# Patient Record
Sex: FEMALE | Race: WHITE | NOT HISPANIC OR LATINO | Employment: OTHER | URBAN - METROPOLITAN AREA
[De-identification: names, ages, dates, MRNs, and addresses within clinical notes are randomized per-mention and may not be internally consistent; named-entity substitution may affect disease eponyms.]

---

## 2017-01-28 ENCOUNTER — HOSPITAL ENCOUNTER (EMERGENCY)
Facility: HOSPITAL | Age: 67
Discharge: HOME | End: 2017-01-28
Payer: MEDICARE

## 2017-01-28 VITALS
HEIGHT: 62 IN | BODY MASS INDEX: 23.92 KG/M2 | WEIGHT: 130 LBS | RESPIRATION RATE: 18 BRPM | TEMPERATURE: 98 F | DIASTOLIC BLOOD PRESSURE: 97 MMHG | OXYGEN SATURATION: 95 % | HEART RATE: 94 BPM | SYSTOLIC BLOOD PRESSURE: 169 MMHG

## 2017-01-28 DIAGNOSIS — R06.2 WHEEZING: ICD-10-CM

## 2017-01-28 DIAGNOSIS — J44.1 COPD EXACERBATION: Primary | ICD-10-CM

## 2017-01-28 PROCEDURE — 63600175 PHARM REV CODE 636 W HCPCS: Performed by: PHYSICIAN ASSISTANT

## 2017-01-28 PROCEDURE — 99283 EMERGENCY DEPT VISIT LOW MDM: CPT | Mod: 25

## 2017-01-28 PROCEDURE — 96372 THER/PROPH/DIAG INJ SC/IM: CPT

## 2017-01-28 RX ORDER — DEXAMETHASONE SODIUM PHOSPHATE 4 MG/ML
4 INJECTION, SOLUTION INTRA-ARTICULAR; INTRALESIONAL; INTRAMUSCULAR; INTRAVENOUS; SOFT TISSUE
Status: COMPLETED | OUTPATIENT
Start: 2017-01-28 | End: 2017-01-28

## 2017-01-28 RX ORDER — MOXIFLOXACIN HYDROCHLORIDE 400 MG/1
400 TABLET ORAL DAILY
Qty: 10 TABLET | Refills: 0 | Status: SHIPPED | OUTPATIENT
Start: 2017-01-28 | End: 2017-02-07

## 2017-01-28 RX ORDER — HYDROCODONE POLISTIREX AND CHLORPHENIRAMINE POLISTIREX 10; 8 MG/5ML; MG/5ML
2.5 SUSPENSION, EXTENDED RELEASE ORAL EVERY 12 HOURS PRN
Qty: 35 ML | Refills: 0 | Status: ON HOLD | OUTPATIENT
Start: 2017-01-28 | End: 2017-07-11 | Stop reason: HOSPADM

## 2017-01-28 RX ADMIN — DEXAMETHASONE SODIUM PHOSPHATE 4 MG: 4 INJECTION, SOLUTION INTRAMUSCULAR; INTRAVENOUS at 06:01

## 2017-01-28 NOTE — ED AVS SNAPSHOT
[facility]  [address] [facility] [address]               Kia Smith   2017  5:05 PM   ED    Description:  Female : 1950   Department:  [facility]            Your Care was Coordinated By:     Provider Role From To    ANGEL Kendall Physician Assistant 17 [phone] --      Reason for Visit     Cough           Diagnoses this Visit        Comments    COPD exacerbation    -  Primary     Wheezing           ED Disposition     None           To Do List           Follow-up Information     Follow up with [facility] In 2 days.    Why:  As needed, If symptoms worsen return to ED     Contact information:    [address] [facility] [address]  [phone]         These Medications        Disp Refills Start End    moxifloxacin (AVELOX) 400 mg tablet 10 tablet 0 2017    Take 1 tablet (400 mg total) by mouth once daily. - Oral    hydrocodone-chlorpheniramine (TUSSIONEX) 10-8 mg/5 mL suspension 35 mL 0 2017     Take 2.5 mLs by mouth every 12 (twelve) hours as needed for Cough. - Oral      [facility]     [facility] On Call Nurse Care Line -  Assistance  Registered nurses in the [facility] On Call Center provide clinical advisement, health education, appointment booking, and other advisory services.  Call for this free service at [phone].             Medications           Message regarding Medications     Verify the changes and/or additions to your medication regime listed below are the same as discussed with your clinician today.  If any of these changes or additions are incorrect, please notify your healthcare provider.        START taking these NEW medications        Refills    moxifloxacin (AVELOX) 400 mg tablet 0    Sig: Take 1 tablet (400 mg total) by mouth once daily.    Class: Print    Route: Oral    hydrocodone-chlorpheniramine (TUSSIONEX) 10-8 mg/5 mL suspension 0  "   Sig: Take 2.5 mLs by mouth every 12 (twelve) hours as needed for Cough.    Class: Print    Route: Oral      These medications were administered today        Dose Freq    dexamethasone injection 4 mg 4 mg ED 1 Time    Sig: Inject 1 mL (4 mg total) into the muscle ED 1 Time.    Class: Normal    Route: Intramuscular    Cosign for Ordering: Required by Misael Chandra MD           Verify that the below list of medications is an accurate representation of the medications you are currently taking.  If none reported, the list may be blank. If incorrect, please contact your healthcare provider. Carry this list with you in case of emergency.           Current Medications     hydrocodone-chlorpheniramine (TUSSIONEX) 10-8 mg/5 mL suspension Take 2.5 mLs by mouth every 12 (twelve) hours as needed for Cough.    moxifloxacin (AVELOX) 400 mg tablet Take 1 tablet (400 mg total) by mouth once daily.           Clinical Reference Information           Your Vitals Were     BP Pulse Temp Resp Height Weight    169/97 (BP Location: Right arm, Patient Position: Sitting) 94 98.3 °F (36.8 °C) (Oral) 18 5' 2" (1.575 m) 59 kg (130 lb)    SpO2 BMI             95% 23.78 kg/m2         Allergies as of 1/28/2017        Reactions    Pcn [Penicillins]       Immunizations Administered on Date of Encounter - 1/28/2017     None      ED Micro, Lab, POCT     None      ED Imaging Orders     Start Ordered       Status Ordering Provider    01/28/17 1717 01/28/17 1716  X-Ray Chest PA And Lateral  1 time imaging      Final result         Discharge Instructions         COPD Flare    You have had a flare-up of your COPD.  COPD, or chronic obstructive pulmonary disease, is a common lung disease. It causes your airways to become irritated and narrower. This makes it harder for you to breathe. Emphysema and chronic bronchitis are both types of COPD. This is a chronic condition, which means you always have it. Sometimes it gets worse. When this happens, it is " called a flare-up.  Symptoms of COPD  People with COPD may have symptoms most of the time. In a flare-up, your symptoms get worse. These symptoms may mean you are having a flare-up:  · Shortness of breath, shallow or rapid breathing, or wheezing that gets worse  · Lung infection  · Cough that gets worse  · More mucus, thicker mucus or mucus of a different color  · Tiredness, decreased energy, or trouble doing your usual activities  · Fever  · Chest tightness  · Your symptoms dont get better even when you use your usual medicines, inhalers, and nebulizer  · Trouble talking  · You feel confused  Causes of flare-ups  Unfortunately, a flare-up can happen even though you did everything right, and you followed your doctors instructions. Some causes of flare-ups are:  · Smoking or secondhand smoke  · Colds, the flu, or respiratory infections  · Air pollution  · Sudden change in the weather  · Dust, irritating chemicals, or strong fumes  · Not taking your medicines as prescribed  Home care  Here are some things you can do at home to treat a flare-up:  · Try not to panic. This makes it harder to breathe, and keeps you from doing the right things.  · Dont smoke or be around others who are smoking.  · Try to drink more fluids than usual during a flare-up, unless your doctor has told you not to because of heart and kidney problems. More fluids can help loosen the mucus.  · Use your inhalers and nebulizer, if you have one, as you have been told to.  · If you were given antibiotics, take them until they are used up or your doctor tells you to stop. Its important to finish the antibiotics, even though you feel better. This will make sure the infection has cleared.  · If you were given prednisone or another steroid, finish it even if you feel better.  Preventing a flare-up  Even though flare-ups happen, the best way to treat one is to prevent it before it starts. Here are some pointers:  · Dont smoke or be around others who  are smoking.  · Take your medicines as you have been told.  · Talk with your doctor about getting a flu shot every year. Also find out if you need a pneumonia shot.  · If there is a weather advisory warning to stay indoors, try to stay inside when possible.  · Try to eat healthy and get plenty of sleep.  · Try to avoid things that usually set you off, like dust, chemical fumes, hairsprays, or strong perfumes.  Follow-up care  Follow up with your healthcare provider.  If a culture was done, you will be told if your treatment needs to be changed. You can call as directed for the results.  If X-rays were done, and a radiologist had not seen them while you were there, they will be reviewed. You will be told if there is a change in the reading, especially if it affects your treatment.  Call 911  Call 911 if any of these occur:  · You have trouble breathing  · You feel confused or its difficult to wake you up  · You faint or lose consciousness  · You have a rapid heart rate  · You have new pain in your chest, arm, shoulder, neck or upper back  When to seek medical advice  Call your healthcare provider right away if any of these occur:  · Wheezing or shortness of breath gets worse  · You need to use your inhalers more often than usual without relief  · Fever of 100.4°F (38ºC) or higher, or as directed  · Coughing up lots of dark-colored or bloody sputum (mucus)  · Chest pain with each breath  · You do not start to get better within 24 hours  · Swelling or your ankles gets worse  · Dizziness or weakness     © 1272-4244 [employer]. [address] [place] [address], [place] [address]. All rights reserved. This information is not intended as a substitute for professional medical care. Always follow your healthcare professional's instructions.          [facility] Sign-Up     Activating your [facility] account is as easy as 1-2-3!     1) Visit [email or web address], select Sign Up Now, enter this activation code and your date of  birth, then select Next.  [email or web address]  Expires: 3/14/2017  6:41 PM      2) Create a username and password to use when you visit [facility] in the future and select a security question in case you lose your password and select Next.    3) Enter your e-mail address and click Sign Up!    Additional Information  If you have questions, please e-mail [email or web address] or call [phone] to talk to our [facility] staff. Remember, [facility] is NOT to be used for urgent needs. For medical emergencies, dial 911.         Smoking Cessation     If you would like to quit smoking:   You may be eligible for free services if you are a [place] resident and started smoking cigarettes before September 1, 1988.  Call the Smoking Cessation [employer] (SCT) toll free at (693) 002-0296 or (689) 862-4695.   Call 2-746-QUIT-NOW if you do not meet the above criteria.             [facility] complies with applicable Federal civil rights laws and does not discriminate on the basis of race, color, national origin, age, disability, or sex.        Language Assistance Services     ATTENTION: Language assistance services are available, free of charge. Please call [phone].      ATENCIÓN: Si habla kathyaañol, tiene a feldman disposición servicios gratuitos de asistencia lingüística. Llame al [phone].     [facility] Ý: N?u b?n nói Ti?ng Vi?t, có các d?ch v? h? tr? ngôn ng? mi?n phí dành cho b?n. G?i s? [phone].

## 2017-01-28 NOTE — ED PROVIDER NOTES
SCRIBE #1 NOTE: I, Dottie Serna, am scribing for, and in the presence of, ANGEL Narayanan. I have scribed the entire note.      History      Chief Complaint   Patient presents with    Cough     productive, sore throat, congestion       Review of patient's allergies indicates:   Allergen Reactions    Pcn [penicillins]         HPI   HPI    1/28/2017, 5:06 PM   History obtained from the patient      History of Present Illness: Kia Smith is a 66 y.o. female patient who presents to the Emergency Department for cough with sputum which onset gradually two weeks ago. Symptoms are constant and moderate in severity. Pt notes that she is a smoker. Pt has PMHx of COPD exacerbation. Sxs worsen in the AM. No mitigating or exacerbating factors reported. Associated sxs include wheezing and SOB. Patient denies any fever, n/v/d, CP, chest tightness, HA, dizziness, abdominal pain, dysuria, hematuria, back pain, and all other sxs at this time. No further complaints or concerns at this time.         Arrival mode: Personal vehicle    PCP: No primary care provider on file.       Past Medical History:  History reviewed. No pertinent past medical history.    Past Surgical History:  Unknown    Family History:  History reviewed. No pertinent family history.    Social History:  Social History     Social History Main Topics    Smoking status: Unknown    Smokeless tobacco: Unknown    Alcohol use Unknown    Drug use: Unknown    Sexual activity: Unknown       ROS   Review of Systems   Constitutional: Negative for fever.   HENT: Negative for sore throat.    Respiratory: Positive for cough (with sputum), shortness of breath and wheezing. Negative for chest tightness.    Cardiovascular: Negative for chest pain.   Gastrointestinal: Negative for abdominal pain, constipation, diarrhea, nausea and vomiting.   Genitourinary: Negative for dysuria, flank pain and hematuria.   Musculoskeletal: Negative for back pain.   Skin: Negative  "for rash.   Neurological: Negative for dizziness, weakness, light-headedness and headaches.   Hematological: Does not bruise/bleed easily.       Physical Exam    Initial Vitals   BP Pulse Resp Temp SpO2   01/28/17 1601 01/28/17 1601 01/28/17 1601 01/28/17 1601 01/28/17 1601   169/97 94 18 98.3 °F (36.8 °C) 95 %      Physical Exam  Nursing Notes and Vital Signs Reviewed.  Constitutional: Patient is in no acute distress. Awake and alert. Well-developed and well-nourished.  Head: Atraumatic. Normocephalic.  Eyes: PERRL. EOM intact. Conjunctivae are not pale. No scleral icterus.  ENT: Mucous membranes are moist. Oropharynx is clear and symmetric.    Neck: Supple. Full ROM. No lymphadenopathy.  Cardiovascular: Regular rate. Regular rhythm. No murmurs, rubs, or gallops. Distal pulses are 2+ and symmetric.  Pulmonary/Chest: No respiratory distress. Wheezing bilaterally. No rales or rhonchi.  Musculoskeletal: Moves all extremities. No obvious deformities. No edema. No calf tenderness.  Skin: Warm and dry.  Neurological:  Alert, awake, and appropriate.  Normal speech.  No acute focal neurological deficits are appreciated.  Psychiatric: Normal affect. Good eye contact. Appropriate in content.    ED Course    Procedures  ED Vital Signs:  Vitals:    01/28/17 1601   BP: (!) 169/97   Pulse: 94   Resp: 18   Temp: 98.3 °F (36.8 °C)   TempSrc: Oral   SpO2: 95%   Weight: 59 kg (130 lb)   Height: 5' 2" (1.575 m)       Imaging Results:  Imaging Results         X-Ray Chest PA And Lateral (Final result) Result time:  01/28/17 17:57:15    Final result by Home Baca MD (Timothy) (01/28/17 17:57:15)    Impression:         Normal sized heart. Clear lungs.Few small calcified benign lymph nodes left infrahilar region.      Electronically signed by: HOME BACA MD  Date:     01/28/17  Time:    17:57     Narrative:    CXR, 2 views    Clinical History:    Wheezing                      The Emergency Provider reviewed the vital signs and " test results, which are outlined above.    ED Discussion     6:39 PM: Reassessed pt at this time. Discussed with pt all pertinent ED information and results. Discussed pt dx and plan of tx. Gave pt all f/u and return to the ED instructions. All questions and concerns were addressed at this time. Pt expresses understanding of information and instructions, and is comfortable with plan to discharge. Pt is stable for discharge.      Pre-hypertension/Hypertension: The pt has been informed that they may have pre-hypertension or hypertension based on a blood pressure reading in the ED. I recommend that the pt call the PCP listed on their discharge instructions or a physician of their choice this week to arrange f/u for further evaluation of possible pre-hypertension or hypertension.     ED Medication(s):  Medications   dexamethasone injection 4 mg (4 mg Intramuscular Given 1/28/17 1826)       Discharge Medication List as of 1/28/2017  6:41 PM      START taking these medications    Details   hydrocodone-chlorpheniramine (TUSSIONEX) 10-8 mg/5 mL suspension Take 2.5 mLs by mouth every 12 (twelve) hours as needed for Cough., Starting 1/28/2017, Until Discontinued, Print      moxifloxacin (AVELOX) 400 mg tablet Take 1 tablet (400 mg total) by mouth once daily., Starting 1/28/2017, Until Tue 2/7/17, Print             Follow-up Information     Follow up with [facility] In 2 days.    Why:  As needed, If symptoms worsen return to ED     Contact information:    [address] [facility] [address]  [phone]              Medical Decision Making    Medical Decision Making:   Clinical Tests:   Radiological Study: Ordered and Reviewed           Scribe Attestation:   Scribe #1: I performed the above scribed service and the documentation accurately describes the services I performed. I attest to the accuracy of the note.    Attending:   Physician Attestation Statement for Scribe #1: I, ANGEL Narayanan,  personally performed the services described in this documentation, as scribed by Dottie Serna, in my presence, and it is both accurate and complete.          Clinical Impression       ICD-10-CM ICD-9-CM   1. COPD exacerbation J44.1 491.21   2. Wheezing R06.2 786.07       Disposition:   Disposition: Discharged  Condition: Stable       ANGEL Kendall  01/29/17 4514

## 2017-01-29 NOTE — DISCHARGE INSTRUCTIONS
COPD Flare    You have had a flare-up of your COPD.  COPD, or chronic obstructive pulmonary disease, is a common lung disease. It causes your airways to become irritated and narrower. This makes it harder for you to breathe. Emphysema and chronic bronchitis are both types of COPD. This is a chronic condition, which means you always have it. Sometimes it gets worse. When this happens, it is called a flare-up.  Symptoms of COPD  People with COPD may have symptoms most of the time. In a flare-up, your symptoms get worse. These symptoms may mean you are having a flare-up:  · Shortness of breath, shallow or rapid breathing, or wheezing that gets worse  · Lung infection  · Cough that gets worse  · More mucus, thicker mucus or mucus of a different color  · Tiredness, decreased energy, or trouble doing your usual activities  · Fever  · Chest tightness  · Your symptoms dont get better even when you use your usual medicines, inhalers, and nebulizer  · Trouble talking  · You feel confused  Causes of flare-ups  Unfortunately, a flare-up can happen even though you did everything right, and you followed your doctors instructions. Some causes of flare-ups are:  · Smoking or secondhand smoke  · Colds, the flu, or respiratory infections  · Air pollution  · Sudden change in the weather  · Dust, irritating chemicals, or strong fumes  · Not taking your medicines as prescribed  Home care  Here are some things you can do at home to treat a flare-up:  · Try not to panic. This makes it harder to breathe, and keeps you from doing the right things.  · Dont smoke or be around others who are smoking.  · Try to drink more fluids than usual during a flare-up, unless your doctor has told you not to because of heart and kidney problems. More fluids can help loosen the mucus.  · Use your inhalers and nebulizer, if you have one, as you have been told to.  · If you were given antibiotics, take them until they are used up or your doctor tells you  to stop. Its important to finish the antibiotics, even though you feel better. This will make sure the infection has cleared.  · If you were given prednisone or another steroid, finish it even if you feel better.  Preventing a flare-up  Even though flare-ups happen, the best way to treat one is to prevent it before it starts. Here are some pointers:  · Dont smoke or be around others who are smoking.  · Take your medicines as you have been told.  · Talk with your doctor about getting a flu shot every year. Also find out if you need a pneumonia shot.  · If there is a weather advisory warning to stay indoors, try to stay inside when possible.  · Try to eat healthy and get plenty of sleep.  · Try to avoid things that usually set you off, like dust, chemical fumes, hairsprays, or strong perfumes.  Follow-up care  Follow up with your healthcare provider.  If a culture was done, you will be told if your treatment needs to be changed. You can call as directed for the results.  If X-rays were done, and a radiologist had not seen them while you were there, they will be reviewed. You will be told if there is a change in the reading, especially if it affects your treatment.  Call 911  Call 911 if any of these occur:  · You have trouble breathing  · You feel confused or its difficult to wake you up  · You faint or lose consciousness  · You have a rapid heart rate  · You have new pain in your chest, arm, shoulder, neck or upper back  When to seek medical advice  Call your healthcare provider right away if any of these occur:  · Wheezing or shortness of breath gets worse  · You need to use your inhalers more often than usual without relief  · Fever of 100.4°F (38ºC) or higher, or as directed  · Coughing up lots of dark-colored or bloody sputum (mucus)  · Chest pain with each breath  · You do not start to get better within 24 hours  · Swelling or your ankles gets worse  · Dizziness or weakness     © 1997-3498 [employer] [address] [place] [address], [place] [address]. All rights reserved. This information is not intended as a substitute for professional medical care. Always follow your healthcare professional's instructions.

## 2017-07-10 ENCOUNTER — HOSPITAL ENCOUNTER (OUTPATIENT)
Facility: HOSPITAL | Age: 67
Discharge: HOME | End: 2017-07-11
Attending: EMERGENCY MEDICINE | Admitting: INTERNAL MEDICINE
Payer: MEDICARE

## 2017-07-10 DIAGNOSIS — Z72.0 NICOTINE ABUSE: ICD-10-CM

## 2017-07-10 DIAGNOSIS — R07.9 CHEST PAIN: ICD-10-CM

## 2017-07-10 DIAGNOSIS — R07.89 ATYPICAL CHEST PAIN: ICD-10-CM

## 2017-07-10 DIAGNOSIS — R03.0 ELEVATED BLOOD PRESSURE READING: Primary | ICD-10-CM

## 2017-07-10 DIAGNOSIS — I25.9 CHRONIC ISCHEMIC HEART DISEASE: ICD-10-CM

## 2017-07-10 DIAGNOSIS — F17.200 SMOKING: ICD-10-CM

## 2017-07-10 LAB
ALBUMIN SERPL BCP-MCNC: 3.5 G/DL
ALP SERPL-CCNC: 90 U/L
ALT SERPL W/O P-5'-P-CCNC: 16 U/L
ANION GAP SERPL CALC-SCNC: 9 MMOL/L
APTT BLDCRRT: 24.8 SEC
AST SERPL-CCNC: 24 U/L
BASOPHILS # BLD AUTO: 0.09 K/UL
BASOPHILS NFR BLD: 1.1 %
BILIRUB SERPL-MCNC: 0.3 MG/DL
BUN SERPL-MCNC: 13 MG/DL
CALCIUM SERPL-MCNC: 9 MG/DL
CHLORIDE SERPL-SCNC: 108 MMOL/L
CK SERPL-CCNC: 150 U/L
CO2 SERPL-SCNC: 25 MMOL/L
CREAT SERPL-MCNC: 0.8 MG/DL
D DIMER PPP IA.FEU-MCNC: 0.47 MG/L FEU
DIFFERENTIAL METHOD: NORMAL
EOSINOPHIL # BLD AUTO: 0.2 K/UL
EOSINOPHIL NFR BLD: 2.7 %
ERYTHROCYTE [DISTWIDTH] IN BLOOD BY AUTOMATED COUNT: 13.3 %
EST. GFR  (AFRICAN AMERICAN): >60 ML/MIN/1.73 M^2
EST. GFR  (NON AFRICAN AMERICAN): >60 ML/MIN/1.73 M^2
GLUCOSE SERPL-MCNC: 97 MG/DL
HCT VFR BLD AUTO: 37.7 %
HGB BLD-MCNC: 12.9 G/DL
INR PPP: 1
LIPASE SERPL-CCNC: 41 U/L
LYMPHOCYTES # BLD AUTO: 3 K/UL
LYMPHOCYTES NFR BLD: 35.1 %
MCH RBC QN AUTO: 30.4 PG
MCHC RBC AUTO-ENTMCNC: 34.2 %
MCV RBC AUTO: 89 FL
MONOCYTES # BLD AUTO: 0.5 K/UL
MONOCYTES NFR BLD: 5.9 %
NEUTROPHILS # BLD AUTO: 4.7 K/UL
NEUTROPHILS NFR BLD: 55.2 %
PLATELET # BLD AUTO: 188 K/UL
PMV BLD AUTO: 9.9 FL
POTASSIUM SERPL-SCNC: 4.2 MMOL/L
PROT SERPL-MCNC: 6.9 G/DL
PROTHROMBIN TIME: 9.9 SEC
RBC # BLD AUTO: 4.25 M/UL
SODIUM SERPL-SCNC: 142 MMOL/L
TROPONIN I SERPL DL<=0.01 NG/ML-MCNC: 0.01 NG/ML
WBC # BLD AUTO: 8.5 K/UL

## 2017-07-10 PROCEDURE — 99285 EMERGENCY DEPT VISIT HI MDM: CPT | Mod: 25

## 2017-07-10 PROCEDURE — 83690 ASSAY OF LIPASE: CPT

## 2017-07-10 PROCEDURE — 85025 COMPLETE CBC W/AUTO DIFF WBC: CPT

## 2017-07-10 PROCEDURE — 82550 ASSAY OF CK (CPK): CPT | Mod: 91

## 2017-07-10 PROCEDURE — 85730 THROMBOPLASTIN TIME PARTIAL: CPT

## 2017-07-10 PROCEDURE — 93010 ELECTROCARDIOGRAM REPORT: CPT | Mod: ,,, | Performed by: INTERNAL MEDICINE

## 2017-07-10 PROCEDURE — 63600175 PHARM REV CODE 636 W HCPCS: Performed by: EMERGENCY MEDICINE

## 2017-07-10 PROCEDURE — 80053 COMPREHEN METABOLIC PANEL: CPT

## 2017-07-10 PROCEDURE — 93005 ELECTROCARDIOGRAM TRACING: CPT

## 2017-07-10 PROCEDURE — C9113 INJ PANTOPRAZOLE SODIUM, VIA: HCPCS | Performed by: EMERGENCY MEDICINE

## 2017-07-10 PROCEDURE — 85379 FIBRIN DEGRADATION QUANT: CPT

## 2017-07-10 PROCEDURE — 25000003 PHARM REV CODE 250: Performed by: EMERGENCY MEDICINE

## 2017-07-10 PROCEDURE — 84484 ASSAY OF TROPONIN QUANT: CPT

## 2017-07-10 PROCEDURE — 85610 PROTHROMBIN TIME: CPT

## 2017-07-10 PROCEDURE — 96374 THER/PROPH/DIAG INJ IV PUSH: CPT

## 2017-07-10 RX ORDER — PANTOPRAZOLE SODIUM 40 MG/10ML
40 INJECTION, POWDER, LYOPHILIZED, FOR SOLUTION INTRAVENOUS
Status: COMPLETED | OUTPATIENT
Start: 2017-07-10 | End: 2017-07-10

## 2017-07-10 RX ORDER — PANTOPRAZOLE SODIUM 40 MG/1
40 TABLET, DELAYED RELEASE ORAL
Status: DISCONTINUED | OUTPATIENT
Start: 2017-07-10 | End: 2017-07-10

## 2017-07-10 RX ADMIN — PANTOPRAZOLE SODIUM 40 MG: 40 INJECTION, POWDER, FOR SOLUTION INTRAVENOUS at 08:07

## 2017-07-10 RX ADMIN — LIDOCAINE HYDROCHLORIDE 50 ML: 20 SOLUTION ORAL; TOPICAL at 08:07

## 2017-07-11 ENCOUNTER — HOSPITAL ENCOUNTER (OUTPATIENT)
Dept: PULMONOLOGY | Facility: HOSPITAL | Age: 67
Discharge: HOME | End: 2017-07-11
Attending: INTERNAL MEDICINE
Payer: MEDICARE

## 2017-07-11 VITALS
TEMPERATURE: 99 F | DIASTOLIC BLOOD PRESSURE: 68 MMHG | RESPIRATION RATE: 20 BRPM | WEIGHT: 151 LBS | OXYGEN SATURATION: 97 % | BODY MASS INDEX: 27.79 KG/M2 | HEIGHT: 62 IN | SYSTOLIC BLOOD PRESSURE: 119 MMHG | HEART RATE: 71 BPM

## 2017-07-11 DIAGNOSIS — R07.9 CHEST PAIN: ICD-10-CM

## 2017-07-11 DIAGNOSIS — R07.9 CHEST PAIN: Primary | ICD-10-CM

## 2017-07-11 PROBLEM — Z72.0 NICOTINE ABUSE: Status: ACTIVE | Noted: 2017-07-11

## 2017-07-11 LAB
CHOLEST/HDLC SERPL: 6.9 {RATIO}
CK SERPL-CCNC: 108 U/L
CK SERPL-CCNC: 108 U/L
CK SERPL-CCNC: 128 U/L
DIASTOLIC DYSFUNCTION: NO
DIASTOLIC DYSFUNCTION: NO
ESTIMATED PA SYSTOLIC PRESSURE: 22.28
HDL/CHOLESTEROL RATIO: 14.5 %
HDLC SERPL-MCNC: 221 MG/DL
HDLC SERPL-MCNC: 32 MG/DL
LDLC SERPL CALC-MCNC: 118.2 MG/DL
NONHDLC SERPL-MCNC: 189 MG/DL
RETIRED EF AND QEF - SEE NOTES: 60 (ref 55–65)
TRIGL SERPL-MCNC: 354 MG/DL
TROPONIN I SERPL DL<=0.01 NG/ML-MCNC: 0.01 NG/ML
TROPONIN I SERPL DL<=0.01 NG/ML-MCNC: 0.02 NG/ML
TROPONIN I SERPL DL<=0.01 NG/ML-MCNC: 0.02 NG/ML
TSH SERPL DL<=0.005 MIU/L-ACNC: 3.51 UIU/ML

## 2017-07-11 PROCEDURE — G0378 HOSPITAL OBSERVATION PER HR: HCPCS

## 2017-07-11 PROCEDURE — 25000003 PHARM REV CODE 250: Performed by: INTERNAL MEDICINE

## 2017-07-11 PROCEDURE — 25000003 PHARM REV CODE 250: Performed by: EMERGENCY MEDICINE

## 2017-07-11 PROCEDURE — 82550 ASSAY OF CK (CPK): CPT

## 2017-07-11 PROCEDURE — 84484 ASSAY OF TROPONIN QUANT: CPT

## 2017-07-11 PROCEDURE — 82550 ASSAY OF CK (CPK): CPT | Mod: 91

## 2017-07-11 PROCEDURE — 93010 ELECTROCARDIOGRAM REPORT: CPT | Mod: 59,ICN,, | Performed by: INTERNAL MEDICINE

## 2017-07-11 PROCEDURE — 84484 ASSAY OF TROPONIN QUANT: CPT | Mod: 91

## 2017-07-11 PROCEDURE — 80061 LIPID PANEL: CPT

## 2017-07-11 PROCEDURE — 36415 COLL VENOUS BLD VENIPUNCTURE: CPT

## 2017-07-11 PROCEDURE — 93306 TTE W/DOPPLER COMPLETE: CPT

## 2017-07-11 PROCEDURE — 93018 CV STRESS TEST I&R ONLY: CPT | Mod: ,,, | Performed by: INTERNAL MEDICINE

## 2017-07-11 PROCEDURE — 93306 TTE W/DOPPLER COMPLETE: CPT | Mod: 26,,, | Performed by: INTERNAL MEDICINE

## 2017-07-11 PROCEDURE — 93016 CV STRESS TEST SUPVJ ONLY: CPT | Mod: ,,, | Performed by: INTERNAL MEDICINE

## 2017-07-11 PROCEDURE — 99204 OFFICE O/P NEW MOD 45 MIN: CPT | Mod: 25,,, | Performed by: INTERNAL MEDICINE

## 2017-07-11 PROCEDURE — 78452 HT MUSCLE IMAGE SPECT MULT: CPT | Mod: 26,,, | Performed by: INTERNAL MEDICINE

## 2017-07-11 PROCEDURE — 63600175 PHARM REV CODE 636 W HCPCS: Performed by: NURSE PRACTITIONER

## 2017-07-11 PROCEDURE — 84443 ASSAY THYROID STIM HORMONE: CPT

## 2017-07-11 RX ORDER — RAMELTEON 8 MG/1
8 TABLET ORAL NIGHTLY PRN
Status: DISCONTINUED | OUTPATIENT
Start: 2017-07-11 | End: 2017-07-11 | Stop reason: HOSPADM

## 2017-07-11 RX ORDER — ACETAMINOPHEN 500 MG
500 TABLET ORAL EVERY 4 HOURS PRN
Status: DISCONTINUED | OUTPATIENT
Start: 2017-07-11 | End: 2017-07-11 | Stop reason: HOSPADM

## 2017-07-11 RX ORDER — FAMOTIDINE 20 MG/1
20 TABLET, FILM COATED ORAL 2 TIMES DAILY
Qty: 60 TABLET | Refills: 0 | Status: SHIPPED | OUTPATIENT
Start: 2017-07-11 | End: 2017-07-18

## 2017-07-11 RX ORDER — NITROGLYCERIN 0.4 MG/1
0.4 TABLET SUBLINGUAL EVERY 5 MIN PRN
Status: DISCONTINUED | OUTPATIENT
Start: 2017-07-11 | End: 2017-07-11 | Stop reason: HOSPADM

## 2017-07-11 RX ORDER — ASPIRIN 325 MG
325 TABLET ORAL DAILY
Status: DISCONTINUED | OUTPATIENT
Start: 2017-07-11 | End: 2017-07-11 | Stop reason: HOSPADM

## 2017-07-11 RX ORDER — NAPROXEN SODIUM 220 MG/1
81 TABLET, FILM COATED ORAL
Status: COMPLETED | OUTPATIENT
Start: 2017-07-11 | End: 2017-07-11

## 2017-07-11 RX ORDER — PANTOPRAZOLE SODIUM 40 MG/1
40 TABLET, DELAYED RELEASE ORAL DAILY
Status: DISCONTINUED | OUTPATIENT
Start: 2017-07-11 | End: 2017-07-11 | Stop reason: HOSPADM

## 2017-07-11 RX ORDER — HYDROCODONE BITARTRATE AND ACETAMINOPHEN 5; 325 MG/1; MG/1
1 TABLET ORAL EVERY 4 HOURS PRN
Status: DISCONTINUED | OUTPATIENT
Start: 2017-07-11 | End: 2017-07-11 | Stop reason: HOSPADM

## 2017-07-11 RX ORDER — REGADENOSON 0.08 MG/ML
0.4 INJECTION, SOLUTION INTRAVENOUS ONCE
Status: COMPLETED | OUTPATIENT
Start: 2017-07-11 | End: 2017-07-11

## 2017-07-11 RX ORDER — FAMOTIDINE 20 MG/1
20 TABLET, FILM COATED ORAL 2 TIMES DAILY
Qty: 60 TABLET | Refills: 0 | Status: SHIPPED | OUTPATIENT
Start: 2017-07-11 | End: 2017-07-11

## 2017-07-11 RX ORDER — AMOXICILLIN 250 MG
1 CAPSULE ORAL 2 TIMES DAILY
Status: DISCONTINUED | OUTPATIENT
Start: 2017-07-11 | End: 2017-07-11 | Stop reason: HOSPADM

## 2017-07-11 RX ORDER — ENOXAPARIN SODIUM 100 MG/ML
40 INJECTION SUBCUTANEOUS EVERY 24 HOURS
Status: DISCONTINUED | OUTPATIENT
Start: 2017-07-11 | End: 2017-07-11 | Stop reason: HOSPADM

## 2017-07-11 RX ORDER — HYDROCODONE BITARTRATE AND ACETAMINOPHEN 10; 325 MG/1; MG/1
1 TABLET ORAL EVERY 4 HOURS PRN
Status: DISCONTINUED | OUTPATIENT
Start: 2017-07-11 | End: 2017-07-11 | Stop reason: HOSPADM

## 2017-07-11 RX ORDER — SODIUM CHLORIDE 9 MG/ML
INJECTION, SOLUTION INTRAVENOUS CONTINUOUS
Status: DISCONTINUED | OUTPATIENT
Start: 2017-07-11 | End: 2017-07-11 | Stop reason: HOSPADM

## 2017-07-11 RX ADMIN — PANTOPRAZOLE SODIUM 40 MG: 40 TABLET, DELAYED RELEASE ORAL at 09:07

## 2017-07-11 RX ADMIN — ASPIRIN 325 MG ORAL TABLET 325 MG: 325 PILL ORAL at 09:07

## 2017-07-11 RX ADMIN — ASPIRIN 81 MG 81 MG: 81 TABLET ORAL at 01:07

## 2017-07-11 RX ADMIN — REGADENOSON 0.4 MG: 0.08 INJECTION, SOLUTION INTRAVENOUS at 03:07

## 2017-07-11 RX ADMIN — STANDARDIZED SENNA CONCENTRATE AND DOCUSATE SODIUM 1 TABLET: 8.6; 5 TABLET, FILM COATED ORAL at 09:07

## 2017-07-11 RX ADMIN — SODIUM CHLORIDE: 0.9 INJECTION, SOLUTION INTRAVENOUS at 03:07

## 2017-07-11 NOTE — H&P
[facility] Hospital Medicine  History & Physical    Patient Name: Kia Smith  MRN: [id number]  Admission Date: 7/10/2017  Attending Physician: Thee Bonilla MD  Primary Care Provider: No primary care provider on file.         Patient information was obtained from patient and ER records.     Subjective:     Principal Problem:<principal problem not specified>    Chief Complaint:   Chief Complaint   Patient presents with    Chest Pain     Pt reports substernal constant CP, radiating into back, x3hrs PTA. Also reports SOB, +pain with deep inspiration. +nausea. +lightheadedness/dizziness, skin pale.        HPI: The patient is a 67-year old female presenting with sudden onset of severe retrosternal chest pressure that started about 6pm while watching television. She states she is under a lot of stress and lives with her daughter and three grandchildren. She states that over more than a week she had been having intermittent chest pressure that was however mild compared with this one. She is presently pain free. She denes any cough, hemoptysis, chills, fever or wheezing.    History reviewed. No pertinent past medical history.    Past Surgical History:   Procedure Laterality Date    APPENDECTOMY      CHOLECYSTECTOMY      HYSTERECTOMY         Review of patient's allergies indicates:   Allergen Reactions    Demerol [meperidine]     Pcn [penicillins]        No current facility-administered medications on file prior to encounter.      Current Outpatient Prescriptions on File Prior to Encounter   Medication Sig    hydrocodone-chlorpheniramine (TUSSIONEX) 10-8 mg/5 mL suspension Take 2.5 mLs by mouth every 12 (twelve) hours as needed for Cough.     Family History     None        Social History Main Topics    Smoking status: Current Every Day Smoker     Packs/day: 0.50     Types: Cigarettes    Smokeless tobacco: Never Used    Alcohol use No    Drug use: No    Sexual activity: Not on file      Review of Systems   Constitutional: Negative.    HENT: Negative.  Negative for facial swelling, nosebleeds, sinus pressure and sore throat.    Eyes: Negative.  Negative for photophobia, pain, discharge, redness, itching and visual disturbance.   Respiratory: Negative.  Negative for apnea, cough, choking, shortness of breath, wheezing and stridor.    Cardiovascular: Positive for chest pain. Negative for palpitations and leg swelling.   Gastrointestinal: Negative.  Negative for abdominal distention, abdominal pain, blood in stool, constipation, diarrhea and nausea.   Endocrine: Negative.  Negative for cold intolerance, heat intolerance, polydipsia, polyphagia and polyuria.   Genitourinary: Negative.  Negative for difficulty urinating, dysuria, enuresis, flank pain, hematuria and urgency.   Musculoskeletal: Negative.  Negative for arthralgias, back pain, gait problem, joint swelling, myalgias, neck pain and neck stiffness.   Skin: Negative.  Negative for color change, pallor, rash and wound.   Allergic/Immunologic: Negative.  Negative for environmental allergies, food allergies and immunocompromised state.   Neurological: Negative.  Negative for dizziness, facial asymmetry, light-headedness, numbness and headaches.   Hematological: Negative for adenopathy. Does not bruise/bleed easily.   Psychiatric/Behavioral: Negative.  Negative for agitation, behavioral problems, confusion, decreased concentration, dysphoric mood and hallucinations. The patient is not nervous/anxious and is not hyperactive.    All other systems reviewed and are negative.    Objective:     Vital Signs (Most Recent):  Temp: 98 °F (36.7 °C) (07/11/17 0320)  Pulse: 70 (07/11/17 0320)  Resp: 18 (07/11/17 0320)  BP: 136/62 (07/11/17 0320)  SpO2: 95 % (07/11/17 0320) Vital Signs (24h Range):  Temp:  [98 °F (36.7 °C)-98.1 °F (36.7 °C)] 98 °F (36.7 °C)  Pulse:  [70-85] 70  Resp:  [15-20] 18  SpO2:  [95 %-100 %] 95 %  BP: (114-184)/(52-75) 136/62      Weight: 63.5 kg (140 lb)  Body mass index is 25.61 kg/m².    Physical Exam   Constitutional: She is oriented to person, place, and time. She appears well-developed and well-nourished. No distress.   HENT:   Head: Normocephalic and atraumatic.   Right Ear: External ear normal.   Left Ear: External ear normal.   Nose: Nose normal.   Mouth/Throat: Oropharynx is clear and moist. No oropharyngeal exudate.   Eyes: Conjunctivae and EOM are normal. Pupils are equal, round, and reactive to light. Right eye exhibits no discharge. Left eye exhibits no discharge. No scleral icterus.   Xanthelasmata over left malar area   Neck: Normal range of motion. Neck supple. No JVD present. No tracheal deviation present. No thyromegaly present.   Cardiovascular: Normal rate, regular rhythm, normal heart sounds and intact distal pulses.  Exam reveals no gallop and no friction rub.    No murmur heard.  Pulmonary/Chest: No respiratory distress. She has no wheezes. She has no rales. She exhibits no tenderness.   Abdominal: Soft. Bowel sounds are normal. She exhibits no distension and no mass. There is no tenderness. There is no rebound and no guarding. No hernia.   Musculoskeletal: Normal range of motion. She exhibits no edema, tenderness or deformity.   Lymphadenopathy:     She has no cervical adenopathy.   Neurological: She is alert and oriented to person, place, and time. She has normal reflexes. She displays normal reflexes. No cranial nerve deficit. Coordination normal.   Skin: Skin is warm and dry. Capillary refill takes less than 2 seconds. No rash noted. She is not diaphoretic. No erythema. No pallor.   Psychiatric: She has a normal mood and affect. Her behavior is normal. Judgment and thought content normal.   Nursing note and vitals reviewed.       Significant Labs:   Recent Lab Results       07/10/17  2321 07/10/17  2037      Albumin  3.5     Alkaline Phosphatase  90     ALT  16     Anion Gap  9     aPTT  24.8  Comment:  aPTT  therapeutic range = 39-69 seconds     AST  24     Baso #  0.09     Basophil%  1.1     Total Bilirubin  0.3  Comment:  For infants and newborns, interpretation of results should be based  on gestational age, weight and in agreement with clinical  observations.  Premature Infant recommended reference ranges:  Up to 24 hours.............<8.0 mg/dL  Up to 48 hours............<12.0 mg/dL  3-5 days..................<15.0 mg/dL  6-29 days.................<15.0 mg/dL       BUN, Bld  13     Calcium  9.0     Chloride  108     CO2  25      150     Creatinine  0.8     D-Dimer  0.47  Comment:  The quantitative D-dimer assay should be used as an aid in   the diagnosis of deep vein thrombosis and pulmonary embolism  in patients with the appropriate presentation and clinical  history. Causes of a positive (>0.50 mg/L FEU) D-Dimer test  include, but are not limited to: DVT, PE, DIC, thrombolytic   therapy, anticoagulant therapy, recent surgery, trauma, or   pregnancy, disseminated malignancy, aortic aneurysm, cirrhosis,  and severe infection. False negative results may occur in   patients with distal DVT.       Differential Method  Automated     eGFR if   >60     eGFR if non   >60  Comment:  Calculation used to obtain the estimated glomerular filtration  rate (eGFR) is the CKD-EPI equation. Since race is unknown   in our information system, the eGFR values for   -American and Non--American patients are given   for each creatinine result.       Eos #  0.2     Eosinophil%  2.7     Glucose  97     Gran #  4.7     Gran%  55.2     Hematocrit  37.7     Hemoglobin  12.9     Coumadin Monitoring INR  1.0  Comment:  Coumadin Therapy:  2.0 - 3.0 for INR for all indicators except mechanical heart valves  and antiphospholipid syndromes which should use 2.5 - 3.5.       Lipase  41     Lymph #  3.0     Lymph%  35.1     MCH  30.4     MCHC  34.2     MCV  89     Mono #  0.5     Mono%  5.9     MPV   9.9     Platelets  188     Potassium  4.2     Total Protein  6.9     Protime  9.9     RBC  4.25     RDW  13.3     Sodium  142     Troponin I 0.023  Comment:  The reference interval for Troponin I represents the 99th percentile   cutoff   for our facility and is consistent with 3rd generation assay   performance.   0.010  Comment:  The reference interval for Troponin I represents the 99th percentile   cutoff   for our facility and is consistent with 3rd generation assay   performance.       WBC  8.50         All pertinent labs within the past 24 hours have been reviewed.    Significant Imaging:   Imaging Results          X-Ray Chest AP Portable (Final result)  Result time 07/10/17 21:06:35    Final result by Maico Rebolledo Jr., MD (07/10/17 21:06:35)                 Impression:          No acute findings       Electronically signed by: MAICO REBOLLEDO MD  Date:     07/10/17  Time:    21:06              Narrative:    EXAM:   XR CHEST AP PORTABLE    CLINICAL HISTORY:   Other chest pain    COMPARISON:  01/28/2017    FINDINGS:   Heart size and pulmonary vascularity appear normal. Lungs are well aerated and appear clear. No suspicious mass, infiltrate or effusion.  Benign left hilar calcification consistent with granulomatous disease, similar to before                            Assessment/Plan:     Chest pain    She has multiple risk factors: age, positive family history of cardiac deaths in her male family members, smoking, xanthelasma. Will admit to rule out MI            VTE Risk Mitigation         Ordered     enoxaparin injection 40 mg  Daily     Route:  Subcutaneous        07/11/17 0309     Medium Risk of VTE  Once      07/11/17 0309        Thee Bonilla MD  Department of Hospital Medicine   [facility]

## 2017-07-11 NOTE — HPI
The patient is a 67-year old female presenting with sudden onset of severe retrosternal chest pressure that started about 6pm while watching television. She states she is under a lot of stress and lives with her daughter and three grandchildren. She states that over more than a week she had been having intermittent chest pressure that was however mild compared with this one. She is presently pain free. She denes any cough, hemoptysis, chills, fever or wheezing.  Hospital Medicine consulted for admission.  Positive family h/o CAD.

## 2017-07-11 NOTE — PLAN OF CARE
Problem: Patient Care Overview  Goal: Plan of Care Review  Outcome: Ongoing (interventions implemented as appropriate)  Patient awake, alert, oriented. Respirations even and unlabored. Patient on room air.  Patient remained free of falls on this shift. No skin breakdown noted.  PIV clean, dry and intact. Patient denies pain at this time.  Room free of clutter. Bed locked and in lowest position. Call light within reach.  Safety precautions in place. Side rails up x2. Patient on telemetry monitor. Sinus rhythm on the monitor. POC reviewed. No concerns voiced at this time. Will continue to monitor patient.

## 2017-07-11 NOTE — ASSESSMENT & PLAN NOTE
She has multiple risk factors: age, positive family history of cardiac deaths in her male family members, smoking, xanthelasma. Will admit to rule out MI

## 2017-07-11 NOTE — SUBJECTIVE & OBJECTIVE
History reviewed. No pertinent past medical history.    Past Surgical History:   Procedure Laterality Date    APPENDECTOMY      CHOLECYSTECTOMY      HYSTERECTOMY         Review of patient's allergies indicates:   Allergen Reactions    Demerol [meperidine]     Pcn [penicillins]        No current facility-administered medications on file prior to encounter.      Current Outpatient Prescriptions on File Prior to Encounter   Medication Sig    hydrocodone-chlorpheniramine (TUSSIONEX) 10-8 mg/5 mL suspension Take 2.5 mLs by mouth every 12 (twelve) hours as needed for Cough.     Family History     None        Social History Main Topics    Smoking status: Current Every Day Smoker     Packs/day: 0.50     Types: Cigarettes    Smokeless tobacco: Never Used    Alcohol use No    Drug use: No    Sexual activity: Not on file     Review of Systems   Constitutional: Negative.    HENT: Negative.  Negative for facial swelling, nosebleeds, sinus pressure and sore throat.    Eyes: Negative.  Negative for photophobia, pain, discharge, redness, itching and visual disturbance.   Respiratory: Negative.  Negative for apnea, cough, choking, shortness of breath, wheezing and stridor.    Cardiovascular: Positive for chest pain. Negative for palpitations and leg swelling.   Gastrointestinal: Negative.  Negative for abdominal distention, abdominal pain, blood in stool, constipation, diarrhea and nausea.   Endocrine: Negative.  Negative for cold intolerance, heat intolerance, polydipsia, polyphagia and polyuria.   Genitourinary: Negative.  Negative for difficulty urinating, dysuria, enuresis, flank pain, hematuria and urgency.   Musculoskeletal: Negative.  Negative for arthralgias, back pain, gait problem, joint swelling, myalgias, neck pain and neck stiffness.   Skin: Negative.  Negative for color change, pallor, rash and wound.   Allergic/Immunologic: Negative.  Negative for environmental allergies, food allergies and  immunocompromised state.   Neurological: Negative.  Negative for dizziness, facial asymmetry, light-headedness, numbness and headaches.   Hematological: Negative for adenopathy. Does not bruise/bleed easily.   Psychiatric/Behavioral: Negative.  Negative for agitation, behavioral problems, confusion, decreased concentration, dysphoric mood and hallucinations. The patient is not nervous/anxious and is not hyperactive.    All other systems reviewed and are negative.    Objective:     Vital Signs (Most Recent):  Temp: 98 °F (36.7 °C) (07/11/17 0320)  Pulse: 70 (07/11/17 0320)  Resp: 18 (07/11/17 0320)  BP: 136/62 (07/11/17 0320)  SpO2: 95 % (07/11/17 0320) Vital Signs (24h Range):  Temp:  [98 °F (36.7 °C)-98.1 °F (36.7 °C)] 98 °F (36.7 °C)  Pulse:  [70-85] 70  Resp:  [15-20] 18  SpO2:  [95 %-100 %] 95 %  BP: (114-184)/(52-75) 136/62     Weight: 63.5 kg (140 lb)  Body mass index is 25.61 kg/m².    Physical Exam   Constitutional: She is oriented to person, place, and time. She appears well-developed and well-nourished. No distress.   HENT:   Head: Normocephalic and atraumatic.   Right Ear: External ear normal.   Left Ear: External ear normal.   Nose: Nose normal.   Mouth/Throat: Oropharynx is clear and moist. No oropharyngeal exudate.   Eyes: Conjunctivae and EOM are normal. Pupils are equal, round, and reactive to light. Right eye exhibits no discharge. Left eye exhibits no discharge. No scleral icterus.   Xanthelasmata over left malar area   Neck: Normal range of motion. Neck supple. No JVD present. No tracheal deviation present. No thyromegaly present.   Cardiovascular: Normal rate, regular rhythm, normal heart sounds and intact distal pulses.  Exam reveals no gallop and no friction rub.    No murmur heard.  Pulmonary/Chest: No respiratory distress. She has no wheezes. She has no rales. She exhibits no tenderness.   Abdominal: Soft. Bowel sounds are normal. She exhibits no distension and no mass. There is no  tenderness. There is no rebound and no guarding. No hernia.   Musculoskeletal: Normal range of motion. She exhibits no edema, tenderness or deformity.   Lymphadenopathy:     She has no cervical adenopathy.   Neurological: She is alert and oriented to person, place, and time. She has normal reflexes. She displays normal reflexes. No cranial nerve deficit. Coordination normal.   Skin: Skin is warm and dry. Capillary refill takes less than 2 seconds. No rash noted. She is not diaphoretic. No erythema. No pallor.   Psychiatric: She has a normal mood and affect. Her behavior is normal. Judgment and thought content normal.   Nursing note and vitals reviewed.       Significant Labs:   Recent Lab Results       07/10/17  2321 07/10/17  2037      Albumin  3.5     Alkaline Phosphatase  90     ALT  16     Anion Gap  9     aPTT  24.8  Comment:  aPTT therapeutic range = 39-69 seconds     AST  24     Baso #  0.09     Basophil%  1.1     Total Bilirubin  0.3  Comment:  For infants and newborns, interpretation of results should be based  on gestational age, weight and in agreement with clinical  observations.  Premature Infant recommended reference ranges:  Up to 24 hours.............<8.0 mg/dL  Up to 48 hours............<12.0 mg/dL  3-5 days..................<15.0 mg/dL  6-29 days.................<15.0 mg/dL       BUN, Bld  13     Calcium  9.0     Chloride  108     CO2  25      150     Creatinine  0.8     D-Dimer  0.47  Comment:  The quantitative D-dimer assay should be used as an aid in   the diagnosis of deep vein thrombosis and pulmonary embolism  in patients with the appropriate presentation and clinical  history. Causes of a positive (>0.50 mg/L FEU) D-Dimer test  include, but are not limited to: DVT, PE, DIC, thrombolytic   therapy, anticoagulant therapy, recent surgery, trauma, or   pregnancy, disseminated malignancy, aortic aneurysm, cirrhosis,  and severe infection. False negative results may occur in   patients with  distal DVT.       Differential Method  Automated     eGFR if   >60     eGFR if non   >60  Comment:  Calculation used to obtain the estimated glomerular filtration  rate (eGFR) is the CKD-EPI equation. Since race is unknown   in our information system, the eGFR values for   -American and Non--American patients are given   for each creatinine result.       Eos #  0.2     Eosinophil%  2.7     Glucose  97     Gran #  4.7     Gran%  55.2     Hematocrit  37.7     Hemoglobin  12.9     Coumadin Monitoring INR  1.0  Comment:  Coumadin Therapy:  2.0 - 3.0 for INR for all indicators except mechanical heart valves  and antiphospholipid syndromes which should use 2.5 - 3.5.       Lipase  41     Lymph #  3.0     Lymph%  35.1     MCH  30.4     MCHC  34.2     MCV  89     Mono #  0.5     Mono%  5.9     MPV  9.9     Platelets  188     Potassium  4.2     Total Protein  6.9     Protime  9.9     RBC  4.25     RDW  13.3     Sodium  142     Troponin I 0.023  Comment:  The reference interval for Troponin I represents the 99th percentile   cutoff   for our facility and is consistent with 3rd generation assay   performance.   0.010  Comment:  The reference interval for Troponin I represents the 99th percentile   cutoff   for our facility and is consistent with 3rd generation assay   performance.       WBC  8.50         All pertinent labs within the past 24 hours have been reviewed.    Significant Imaging:   Imaging Results          X-Ray Chest AP Portable (Final result)  Result time 07/10/17 21:06:35    Final result by Maico Rebolledo Jr., MD (07/10/17 21:06:35)                 Impression:          No acute findings       Electronically signed by: MAICO REBOLLEDO MD  Date:     07/10/17  Time:    21:06              Narrative:    EXAM:   XR CHEST AP PORTABLE    CLINICAL HISTORY:   Other chest pain    COMPARISON:  01/28/2017    FINDINGS:   Heart size and pulmonary vascularity appear normal. Lungs  are well aerated and appear clear. No suspicious mass, infiltrate or effusion.  Benign left hilar calcification consistent with granulomatous disease, similar to before

## 2017-07-11 NOTE — ED PROVIDER NOTES
SCRIBE #1 NOTE: I, Lana Nazario, am scribing for, and in the presence of, Lizzy Rajan DO. I have scribed the entire note.      History      Chief Complaint   Patient presents with    Chest Pain     Pt reports substernal constant CP, radiating into back, x3hrs PTA. Also reports SOB, +pain with deep inspiration. +nausea. +lightheadedness/dizziness, skin pale.       Review of patient's allergies indicates:   Allergen Reactions    Demerol [meperidine]     Pcn [penicillins]         HPI   HPI    7/10/2017, 8:33 PM   History obtained from the patient      History of Present Illness: Kia Smith is a 67 y.o. female patient who presents to the Emergency Department for chest pain that radiates to her back which onset gradually around 5pm today after eating fried chicken. Symptoms are constant and moderate in severity. Pt currently rates the pain a 7/10. Sxs exacerbated with breathing and mitigated by applying pressure to her chest. Patient was worsened by eating fried chicken for dinner.  No other mitigating or exacerbating factors reported. Associated sxs include nausea. Patient denies any SOB, diaphoresis, palpitations, extremity weakness/numbness, leg pain/swelling, dizziness, cough, vomiting, recent travel, long car trips, fever, and all other sxs at this time. Pt is a current smoker.  No primary hx of CAD. No further complaints or concerns at this time.         Arrival mode: Personal vehicle     PCP: No primary care provider on file.       Past Medical History:  History reviewed. No pertinent past medical history.    Past Surgical History:  Past Surgical History:   Procedure Laterality Date    APPENDECTOMY      CHOLECYSTECTOMY      HYSTERECTOMY           Family History:  History reviewed. No pertinent family history.    Social History:  Social History     Social History Main Topics    Smoking status: Current Every Day Smoker     Packs/day: 0.50     Types: Cigarettes    Smokeless tobacco:  Never Used    Alcohol use No    Drug use: No    Sexual activity: Unknown       ROS   Review of Systems   Constitutional: Negative for diaphoresis and fever.        (-) recent travel, long car trips   HENT: Negative for sore throat.    Respiratory: Negative for cough and shortness of breath.    Cardiovascular: Positive for chest pain. Negative for palpitations and leg swelling.   Gastrointestinal: Positive for nausea. Negative for vomiting.   Genitourinary: Negative for dysuria.   Musculoskeletal: Negative for back pain.        (-)  Leg pain   Skin: Negative for rash.   Neurological: Negative for dizziness, weakness and numbness.   Hematological: Does not bruise/bleed easily.   All other systems reviewed and are negative.      Physical Exam      Initial Vitals [07/10/17 2006]   BP Pulse Resp Temp SpO2   (!) 184/73 82 20 98 °F (36.7 °C) 99 %      MAP       110          Physical Exam  Nursing Notes and Vital Signs Reviewed.  Constitutional: Patient is in no acute distress. Well-developed and well-nourished.  Head: Atraumatic. Normocephalic.  Eyes: PERRL. EOM intact. Conjunctivae are not pale. No scleral icterus.  ENT: Mucous membranes are moist. Oropharynx is clear and symmetric.    Neck: Supple. Full ROM. No lymphadenopathy.  Cardiovascular: Regular rate. Regular rhythm. No murmurs, rubs, or gallops. Distal pulses are 2+ and symmetric.  Pulmonary/Chest: No respiratory distress. Clear to auscultation bilaterally. No wheezing, rales, or rhonchi.  Abdominal: Soft and non-distended.  There is no tenderness.  No rebound, guarding, or rigidity.   Musculoskeletal: Moves all extremities. No obvious deformities. No edema. No calf tenderness or calf pain.  Skin: Warm and dry.  Neurological:  Alert, awake, and appropriate.  Normal speech.  No acute focal neurological deficits are appreciated.  Psychiatric: Normal affect. Good eye contact. Appropriate in content.    ED Course    Procedures  ED Vital Signs:  Vitals:    07/10/17  "2006 07/10/17 2024 07/10/17 2132 07/10/17 2232   BP: (!) 184/73 (!) 146/71 119/62 114/64   Pulse: 82 80 73 75   Resp: 20 17 16 15   Temp: 98 °F (36.7 °C) 98.1 °F (36.7 °C) 98 °F (36.7 °C) 98.1 °F (36.7 °C)   TempSrc: Oral Oral Oral Oral   SpO2: 99% 100% 99% 99%   Weight: 63.5 kg (140 lb)      Height: 5' 2" (1.575 m)       07/10/17 2332 07/11/17 0051   BP: (!) 141/65 122/75   Pulse: 74 70   Resp: 20 18   Temp: 98 °F (36.7 °C) 98.1 °F (36.7 °C)   TempSrc: Oral Oral   SpO2: 97% 99%   Weight:     Height:         Abnormal Lab Results:  Labs Reviewed   CBC W/ AUTO DIFFERENTIAL   COMPREHENSIVE METABOLIC PANEL   PROTIME-INR   APTT   D DIMER, QUANTITATIVE   LIPASE   TROPONIN I   CK   TROPONIN I   CK   TROPONIN I   CK        All Lab Results:  Results for orders placed or performed during the hospital encounter of 07/10/17   CBC auto differential   Result Value Ref Range    WBC 8.50 3.90 - 12.70 K/uL    RBC 4.25 4.00 - 5.40 M/uL    Hemoglobin 12.9 12.0 - 16.0 g/dL    Hematocrit 37.7 37.0 - 48.5 %    MCV 89 82 - 98 fL    MCH 30.4 27.0 - 31.0 pg    MCHC 34.2 32.0 - 36.0 %    RDW 13.3 11.5 - 14.5 %    Platelets 188 150 - 350 K/uL    MPV 9.9 9.2 - 12.9 fL    Gran # 4.7 1.8 - 7.7 K/uL    Lymph # 3.0 1.0 - 4.8 K/uL    Mono # 0.5 0.3 - 1.0 K/uL    Eos # 0.2 0.0 - 0.5 K/uL    Baso # 0.09 0.00 - 0.20 K/uL    Gran% 55.2 38.0 - 73.0 %    Lymph% 35.1 18.0 - 48.0 %    Mono% 5.9 4.0 - 15.0 %    Eosinophil% 2.7 0.0 - 8.0 %    Basophil% 1.1 0.0 - 1.9 %    Differential Method Automated    Comprehensive metabolic panel   Result Value Ref Range    Sodium 142 136 - 145 mmol/L    Potassium 4.2 3.5 - 5.1 mmol/L    Chloride 108 95 - 110 mmol/L    CO2 25 23 - 29 mmol/L    Glucose 97 70 - 110 mg/dL    BUN, Bld 13 8 - 23 mg/dL    Creatinine 0.8 0.5 - 1.4 mg/dL    Calcium 9.0 8.7 - 10.5 mg/dL    Total Protein 6.9 6.0 - 8.4 g/dL    Albumin 3.5 3.5 - 5.2 g/dL    Total Bilirubin 0.3 0.1 - 1.0 mg/dL    Alkaline Phosphatase 90 55 - 135 U/L    AST 24 10 - 40 " U/L    ALT 16 10 - 44 U/L    Anion Gap 9 8 - 16 mmol/L    eGFR if African American >60 >60 mL/min/1.73 m^2    eGFR if non African American >60 >60 mL/min/1.73 m^2   Protime-INR   Result Value Ref Range    Prothrombin Time 9.9 9.0 - 12.5 sec    INR 1.0 0.8 - 1.2   APTT   Result Value Ref Range    aPTT 24.8 21.0 - 32.0 sec   D dimer, quantitative   Result Value Ref Range    D-Dimer 0.47 <0.50 mg/L FEU   Lipase   Result Value Ref Range    Lipase 41 4 - 60 U/L   Troponin I   Result Value Ref Range    Troponin I 0.010 0.000 - 0.026 ng/mL   CK   Result Value Ref Range     20 - 180 U/L   Troponin I   Result Value Ref Range    Troponin I 0.023 0.000 - 0.026 ng/mL   CPK   Result Value Ref Range     20 - 180 U/L         Imaging Results:  Imaging Results          X-Ray Chest AP Portable (Final result)  Result time 07/10/17 21:06:35    Final result by Maico Rebolledo Jr., MD (07/10/17 21:06:35)                 Impression:          No acute findings       Electronically signed by: MAICO REBOLLEDO MD  Date:     07/10/17  Time:    21:06              Narrative:    EXAM:   XR CHEST AP PORTABLE    CLINICAL HISTORY:   Other chest pain    COMPARISON:  01/28/2017    FINDINGS:   Heart size and pulmonary vascularity appear normal. Lungs are well aerated and appear clear. No suspicious mass, infiltrate or effusion.  Benign left hilar calcification consistent with granulomatous disease, similar to before                             The EKG was ordered, reviewed, and independently interpreted by the ED provider.  Interpretation time: 2016  Rate: 74 BPM  Rhythm: normal sinus rhythm  Interpretation: No acute ST changes. No STEMI.    The EKG was ordered, reviewed, and independently interpreted by the ED provider.  Interpretation time: 1:01 AM  Rate: 74 BPM  Rhythm: Sinus rhythm with premature supraventricular complexes  Interpretation: Low voltage QRS. No STEMI.             The Emergency Provider reviewed the vital signs and test  results, which are outlined above.    Results for YESSY CONTRERAS (MRN [id number]) as of 7/11/2017 01:08   Ref. Range 7/10/2017 20:37 7/10/2017 20:45 7/10/2017 23:21   CPK Latest Ref Range: 20 - 180 U/L 150  128   Troponin I Latest Ref Range: 0.000 - 0.026 ng/mL 0.010  0.023       ED Discussion     9:14 PM: Re-evaluated pt. Pt states that she cannot finish GI cocktail because it tastes bad. Pt drank about 3/4 of it.     11:03 PM: Re-evaluated pt. Pt is resting comfortably and is in no acute distress.  Pt states that she is feeling better.  D/w pt all pertinent results. D/w pt any concerns expressed at this time. Answered all questions. Pt expresses understanding at this time.    1:30 AM: Re-evaluated pt. Pt's troponin increased more than 20 pecernt in 2 hours. Pt's heart score is 4 and has risk factors that put pt at moderate risk for heart disease. I have discussed test results, shared treatment plan, and the need for admission with patient and family at bedside. Pt and family express understanding at this time and agree with all information. All questions answered. Pt and family have no further questions or concerns at this time. Pt is ready for admit.    1:33 AM: Discussed case with Dr. Bonilla ([facility]). Dr. Bonilla agrees with current care and management of pt and accepts admission.   Admitting Service: Hospital medicine   Admitting Physician: Dr. Bonilla  Admit to: Obs        HEART SCORE For Chest Pain Risk (.edmheartscore):  HEART SCORE:  Risk stratification scoring that has been prospectively and retrospectively validated for risk stratifying patients at risk for coronary artery disease and the rate of Major Adverse Cardiac Events (MACE) defined as myocardial infarction, positive catheterization or intervention, CABG, or Death.      Category Item Points Patient Score   History Highly Suspicious 2 0    Moderately Suspicious 1     Slightly Suspicious 0    ECG Significant ST-deviation 2 0     Nonspecific repolarization  1     Normal 0    Age >65 Years Old 2 2    >45 and <65 Years Old 1     <45 years old 0    Risk Factors ?3 risk factors or history or known disease 2 2    1 or 2 risk factors 1     No risk factors known 0    Troponin ? 3 times the normal limit 2 0    >1 and <3 times the normal limit 1     ?1 times the normal limit 0    Total Score: 4     Risk Factors for Atherosclerotic Disease: Hypercholesterolemia, Smoking, Hypertension, Diabetes, Positive Family History, Obesity    Score Interpretation:   The initial study classified three categories of risk:  0-3 points = 2.5% MACE over next 6 weeks, consider close outpatient workup  4-6 points = 20.3% MACE over next 6 weeks, consider inpatient observation for testing  7-10 points = 72.7% MACE over next 6 weeks, consider early invasive strategies    Patient Interpretation:  This patient is considered moderate Risk based upon their heart score.      I have discussed with patient and/or family/caretaker chest pain precautions, specifically to return for worsening chest pain, shortness of breath, fever, or any concern.  I havel low suspicion for cardiopulmonary, vascular, infectious, respiratory, or other emergent medical condition based on my evaluation in the ED.        I discussed with patient and/or family/caretaker that evaluation in the ED does not suggest any emergent or life threatening medical conditions requiring immediate intervention beyond what was provided in the ED, and I believe patient is safe for discharge.  Regardless, an unremarkable evaluation in the ED does not preclude the development or presence of a serious of life threatening condition. As such, patient was instructed to return immediately for any worsening or change in current symptoms.    ED Medication(s):  Medications   aspirin chewable tablet 81 mg (not administered)   GI cocktail (mylanta 30 mL, lidocaine 2 % viscous 10 mL, dicyclomine 10 mL) 50 mL (50 mLs Oral Given 7/10/17  2052)   pantoprazole injection 40 mg (40 mg Intravenous Given 7/10/17 2052)           Medical Decision Making    Medical Decision Making:   Clinical Tests:   Lab Tests: Ordered and Reviewed  Radiological Study: Ordered and Reviewed  Medical Tests: Ordered and Reviewed           Scribe Attestation:   Scribe #1: I performed the above scribed service and the documentation accurately describes the services I performed. I attest to the accuracy of the note.    Attending:   Physician Attestation Statement for Scribe #1: I, Lizzy Rajan DO, personally performed the services described in this documentation, as scribed by Lana Nazario, in my presence, and it is both accurate and complete.          Clinical Impression       ICD-10-CM ICD-9-CM   1. Elevated blood pressure reading R03.0 796.2   2. Chest pain R07.9 786.50   3. Atypical chest pain R07.89 786.59   4. Smoking F17.200 305.1       Disposition:   Disposition: Placed in Observation  Condition: Fair         Lizzy Rajan DO  07/11/17 0723

## 2017-07-11 NOTE — CONSULTS
[facility] -   Cardiology  Consult Note    Patient Name: Kia Smith  MRN: [id number]  Admission Date: 7/10/2017  Hospital Length of Stay: 0 days  Code Status: Full Code   Attending Provider: Sekou Mishra MD   Consulting Provider: Jermaine Bryant MD  Primary Care Physician: Primary Doctor No  Principal Problem:<principal problem not specified>    Patient information was obtained from patient, past medical records and ER records.     Consults  Subjective:     Chief Complaint:  Chest pain     HPI: Pt presents with chest pain.  No prior h/o cad.  Has a lot of stress in her life right now per pt and had cp sxs yesterday, described as a pressure mid left chest.  CP prompted pt to go to ER and is admitted, still with cp sxs.  ecg shows NSR, no st-t abnl.  Serial troponin negative.   + smoker  + family h/o CAD.    History reviewed. No pertinent past medical history.    Past Surgical History:   Procedure Laterality Date    APPENDECTOMY      CHOLECYSTECTOMY      HYSTERECTOMY         Review of patient's allergies indicates:   Allergen Reactions    Demerol [meperidine]     Pcn [penicillins]        No current facility-administered medications on file prior to encounter.      Current Outpatient Prescriptions on File Prior to Encounter   Medication Sig    hydrocodone-chlorpheniramine (TUSSIONEX) 10-8 mg/5 mL suspension Take 2.5 mLs by mouth every 12 (twelve) hours as needed for Cough.     Family History     None        Social History Main Topics    Smoking status: Current Every Day Smoker     Packs/day: 0.50     Types: Cigarettes    Smokeless tobacco: Never Used    Alcohol use No    Drug use: No    Sexual activity: Not on file     Review of Systems   Constitution: Negative.   HENT: Negative.    Eyes: Negative.    Cardiovascular: Positive for chest pain.   Endocrine: Negative.    Hematologic/Lymphatic: Negative.    Skin: Negative.    Musculoskeletal: Negative.    Gastrointestinal:  Negative.    Genitourinary: Negative.    Neurological: Negative.    Psychiatric/Behavioral: Negative.    Allergic/Immunologic: Negative.      Objective:     Vital Signs (Most Recent):  Temp: 98.7 °F (37.1 °C) (07/11/17 0900)  Pulse: 71 (07/11/17 0900)  Resp: 20 (07/11/17 0900)  BP: 119/68 (07/11/17 0900)  SpO2: 97 % (07/11/17 0900) Vital Signs (24h Range):  Temp:  [98 °F (36.7 °C)-98.7 °F (37.1 °C)] 98.7 °F (37.1 °C)  Pulse:  [70-85] 71  Resp:  [15-20] 20  SpO2:  [95 %-100 %] 97 %  BP: (114-184)/(52-75) 119/68     Weight: 68.5 kg (151 lb)  Body mass index is 27.62 kg/m².    SpO2: 97 %  O2 Device (Oxygen Therapy): room air      Intake/Output Summary (Last 24 hours) at 07/11/17 1319  Last data filed at 07/11/17 0900   Gross per 24 hour   Intake           539.17 ml   Output                0 ml   Net           539.17 ml       Lines/Drains/Airways     Peripheral Intravenous Line                 Peripheral IV - Single Lumen 07/10/17 2321 Right Antecubital less than 1 day                Physical Exam   Constitutional: She is oriented to person, place, and time. She appears well-developed and well-nourished. No distress.   HENT:   Head: Normocephalic and atraumatic.   Eyes: Conjunctivae and EOM are normal.   Neck: Normal range of motion. Neck supple. Normal carotid pulses, no hepatojugular reflux and no JVD present. Carotid bruit is not present. No tracheal deviation present. No thyromegaly present.   Cardiovascular: Normal rate, regular rhythm, S1 normal, S2 normal and intact distal pulses.  Exam reveals no gallop, no S3 and no S4.    Pulses:       Radial pulses are 2+ on the right side, and 2+ on the left side.   Pulmonary/Chest: Effort normal and breath sounds normal. No respiratory distress. She has no wheezes. She has no rales. She exhibits no tenderness.   Abdominal: Soft. Bowel sounds are normal. She exhibits no distension, no abdominal bruit and no mass. There is no tenderness. There is no rebound and no guarding.    Musculoskeletal: She exhibits no edema.   Lymphadenopathy:     She has no cervical adenopathy.   Neurological: She is alert and oriented to person, place, and time.   Skin: Skin is warm and dry. She is not diaphoretic.   Psychiatric: She has a normal mood and affect.   Nursing note and vitals reviewed.      Significant Labs:   CMP   Recent Labs  Lab 07/10/17  2037      K 4.2      CO2 25   GLU 97   BUN 13   CREATININE 0.8   CALCIUM 9.0   PROT 6.9   ALBUMIN 3.5   BILITOT 0.3   ALKPHOS 90   AST 24   ALT 16   ANIONGAP 9   ESTGFRAFRICA >60   EGFRNONAA >60   , CBC   Recent Labs  Lab 07/10/17  2037   WBC 8.50   HGB 12.9   HCT 37.7       and Troponin   Recent Labs  Lab 07/10/17  2321 07/11/17  0456 07/11/17  1214   TROPONINI 0.023 0.012 0.019       I have reviewed all pertinent labs and cardiac studies.      Assessment and Plan:     Active Diagnoses:    Diagnosis Date Noted POA    Chest pain [R07.9] 07/11/2017 Yes    Nicotine abuse [Z72.0] 07/11/2017 Yes      Problems Resolved During this Admission:    Diagnosis Date Noted Date Resolved POA       VTE Risk Mitigation         Ordered     enoxaparin injection 40 mg  Daily     Route:  Subcutaneous        07/11/17 0309     Medium Risk of VTE  Once      07/11/17 0309        ATYPICAL CP SXS, POSSIBLY STRESS RELATED CP.  MULTIPLE RISK FACTORS FOR CAD.  SERIAL TROPONIN NEGATIVE.  SMOKING CESSATION ADVISED.  WILL PROCEED WITH STRESS TESTING AND ECHOCARDIOGRAM FOR FURTHER EVALUATION.        Thank you for your consult.     Jermaine Bryant MD  Cardiology   [facility] -

## 2017-07-11 NOTE — PLAN OF CARE
CM met with patient regarding discharge planning.  The patient plans to return home upon discharge.  The patient states she is ready to leave now.  The patient reports her daughter and grandchildren live with her.  The patient reports independence with all adl's.  No post hospital needs noted at this time.      07/11/17 0910   Discharge Assessment   Assessment Type Discharge Planning Assessment   Confirmed/corrected address and phone number on facesheet? Yes   Assessment information obtained from? Patient   Expected Length of Stay (days) 1   Communicated expected length of stay with patient/caregiver yes   Prior to hospitilization cognitive status: Alert/Oriented   Prior to hospitalization functional status: Independent   Current cognitive status: Alert/Oriented   Current Functional Status: Independent   Arrived From home or self-care   Lives With child(shaq), adult;grandchild(shaq)   Able to Return to Prior Arrangements yes   Is patient able to care for self after discharge? Yes   Patient's perception of discharge disposition home or selfcare   Readmission Within The Last 30 Days no previous admission in last 30 days   Patient currently being followed by outpatient case management? No   Patient currently receives home health services? No   Does the patient currently use HME? No   Patient currently receives private duty nursing? No   Patient currently receives any other outside agency services? No   Equipment Currently Used at Home none   Do you have any financial concerns preventing you from receiving the healthcare you need? No   Does the patient have transportation to healthcare appointments? Yes   Transportation Available car   On Dialysis? No   Does the patient receive services at the Coumadin Clinic? No   Are there any open cases? No   Discharge Plan A Home   Discharge Plan B Home   Patient/Family In Agreement With Plan yes

## 2017-07-18 ENCOUNTER — OFFICE VISIT (OUTPATIENT)
Dept: CARDIOLOGY | Facility: CLINIC | Age: 67
End: 2017-07-18
Payer: MEDICARE

## 2017-07-18 VITALS
HEART RATE: 84 BPM | WEIGHT: 158.94 LBS | HEIGHT: 62 IN | BODY MASS INDEX: 29.25 KG/M2 | SYSTOLIC BLOOD PRESSURE: 126 MMHG | DIASTOLIC BLOOD PRESSURE: 74 MMHG

## 2017-07-18 DIAGNOSIS — R06.09 DOE (DYSPNEA ON EXERTION): ICD-10-CM

## 2017-07-18 DIAGNOSIS — Z72.0 NICOTINE ABUSE: ICD-10-CM

## 2017-07-18 DIAGNOSIS — R07.9 CHEST PAIN, UNSPECIFIED TYPE: Primary | ICD-10-CM

## 2017-07-18 PROCEDURE — 99214 OFFICE O/P EST MOD 30 MIN: CPT | Mod: S$PBB,,, | Performed by: NURSE PRACTITIONER

## 2017-07-18 PROCEDURE — 99213 OFFICE O/P EST LOW 20 MIN: CPT | Mod: PBBFAC,PO | Performed by: NURSE PRACTITIONER

## 2017-07-18 PROCEDURE — 1159F MED LIST DOCD IN RCRD: CPT | Mod: ,,, | Performed by: NURSE PRACTITIONER

## 2017-07-18 PROCEDURE — 99999 PR PBB SHADOW E&M-EST. PATIENT-LVL III: CPT | Mod: PBBFAC,,, | Performed by: NURSE PRACTITIONER

## 2017-07-18 NOTE — PROGRESS NOTES
Subjective:    Patient ID:  iKa Smith is a 67 y.o. female who presents for follow-up of Chest Pain (hosp f/u) and Hospital Follow Up      HPI   Ms Smith is a 67 year old female with PMHx of tobacco abuse. She visits the clinic today for hospital follow up. Patient was admitted to [facility] with Chest pain on 7/10/2017. She reports having increase stress in her life, may be contributing to symptoms. 2 D Echo on 7/11/2017 showed normal left ventricular systolic function (EF 60-65%), normal left ventricular diastolic function and normal right ventricular systolic function. Stress Test on 7/11/2017 showed no evidence for myocardial ischemia or injury. She currently smokes 1 ppd cigarette, however trying to cut back. She has been experiencing HINES and is getting worse. Patient also reports weight gain. She has moved to the [facility] from [place] and has enjoyed to food.       Review of Systems   Constitution: Positive for weight gain. Negative for diaphoresis, weakness and malaise/fatigue.   HENT: Negative for congestion and nosebleeds.    Eyes: Negative for blurred vision and double vision.   Cardiovascular: Positive for dyspnea on exertion. Negative for chest pain, claudication, cyanosis, irregular heartbeat, leg swelling, near-syncope, orthopnea, palpitations, paroxysmal nocturnal dyspnea and syncope.   Respiratory: Positive for cough. Negative for hemoptysis, shortness of breath, sputum production and wheezing.    Hematologic/Lymphatic: Negative for bleeding problem. Does not bruise/bleed easily.   Skin: Negative for rash.   Musculoskeletal: Negative for back pain, falls, joint pain, joint swelling and neck pain.   Gastrointestinal: Negative for abdominal pain, heartburn and vomiting.   Genitourinary: Negative for dysuria, frequency and hematuria.   Neurological: Negative for difficulty with concentration, dizziness, focal weakness, light-headedness, numbness and seizures.    Psychiatric/Behavioral: Negative for depression, memory loss and substance abuse.        Stress   Allergic/Immunologic: Negative for HIV exposure and hives.               No past medical history on file.  Past Surgical History:   Procedure Laterality Date    APPENDECTOMY      CHOLECYSTECTOMY      HYSTERECTOMY       No family history on file.  Social History     Social History    Marital status: Single     Spouse name: N/A    Number of children: N/A    Years of education: N/A     Social History Main Topics    Smoking status: Current Every Day Smoker     Packs/day: 0.50     Types: Cigarettes    Smokeless tobacco: Never Used    Alcohol use No    Drug use: No    Sexual activity: Not Asked     Other Topics Concern    None     Social History Narrative    None     Review of patient's allergies indicates:   Allergen Reactions    Demerol [meperidine]     Pcn [penicillins]      Current Outpatient Prescriptions on File Prior to Visit   Medication Sig Dispense Refill    [DISCONTINUED] famotidine (PEPCID) 20 MG tablet Take 1 tablet (20 mg total) by mouth 2 (two) times daily. 60 tablet 0     No current facility-administered medications on file prior to visit.      .  Objective:    Physical Exam   Constitutional: She is oriented to person, place, and time. She appears well-developed and well-nourished.   HENT:   Head: Normocephalic and atraumatic.   Eyes: Pupils are equal, round, and reactive to light.   Neck: Normal range of motion. No JVD present.   Cardiovascular: Exam reveals no gallop and no friction rub.    No murmur heard.  Pulmonary/Chest: Effort normal and breath sounds normal. No respiratory distress. She has no wheezes. She has no rales. She exhibits no tenderness.   Abdominal: Soft. Bowel sounds are normal. She exhibits no distension and no mass. There is no tenderness. There is no rebound and no guarding.   Musculoskeletal: She exhibits no edema or deformity.   Neurological: She is alert and oriented  "to person, place, and time.   Skin: Skin is warm and dry.   Nursing note and vitals reviewed.        Lab Results   Component Value Date    CHOL 221 (H) 07/11/2017     Lab Results   Component Value Date    HDL 32 (L) 07/11/2017     Lab Results   Component Value Date    LDLCALC 118.2 07/11/2017     Lab Results   Component Value Date    TRIG 354 (H) 07/11/2017     Lab Results   Component Value Date    CHOLHDL 14.5 (L) 07/11/2017       Chemistry        Component Value Date/Time     07/10/2017 2037    K 4.2 07/10/2017 2037     07/10/2017 2037    CO2 25 07/10/2017 2037    BUN 13 07/10/2017 2037    CREATININE 0.8 07/10/2017 2037    GLU 97 07/10/2017 2037        Component Value Date/Time    CALCIUM 9.0 07/10/2017 2037    ALKPHOS 90 07/10/2017 2037    AST 24 07/10/2017 2037    ALT 16 07/10/2017 2037    BILITOT 0.3 07/10/2017 2037    ESTGFRAFRICA >60 07/10/2017 2037    EGFRNONAA >60 07/10/2017 2037          Lab Results   Component Value Date    TSH 3.508 07/11/2017     Lab Results   Component Value Date    INR 1.0 07/10/2017     Lab Results   Component Value Date    WBC 8.50 07/10/2017    HGB 12.9 07/10/2017    HCT 37.7 07/10/2017    MCV 89 07/10/2017     07/10/2017   /74   Pulse 84   Ht 5' 2" (1.575 m)   Wt 72.1 kg (158 lb 15.2 oz)   BMI 29.07 kg/m²     Assessment:       1. Chest pain, unspecified type    2. Nicotine abuse    3. HINES (dyspnea on exertion)        Patient presents to clinic today for hospital follow up for chest pain. No sign of ADHF on exam.   She had a normal Stress Test and 2 D Echo, however continues to experience increase HINES.  Patient has long history of smoking, which may be contributing to her symptoms.        Plan:       Will continue current medications and treatment plan  Risk modification   Heart health diet   Smoking cessation encouraged  Pulmonary Referral for increase HINES, one ppd smoker for 40 years   Follow up in clinic in 6 months or sooner if needed             "

## 2017-07-20 ENCOUNTER — TELEPHONE (OUTPATIENT)
Dept: PULMONOLOGY | Facility: CLINIC | Age: 67
End: 2017-07-20

## 2017-07-20 DIAGNOSIS — R06.09 DYSPNEA ON EXERTION: ICD-10-CM

## 2017-07-20 DIAGNOSIS — R06.02 SOB (SHORTNESS OF BREATH): Primary | ICD-10-CM

## 2017-07-24 ENCOUNTER — OFFICE VISIT (OUTPATIENT)
Dept: PULMONOLOGY | Facility: CLINIC | Age: 67
End: 2017-07-24
Payer: MEDICARE

## 2017-07-24 VITALS
BODY MASS INDEX: 29.56 KG/M2 | DIASTOLIC BLOOD PRESSURE: 70 MMHG | HEART RATE: 80 BPM | RESPIRATION RATE: 22 BRPM | SYSTOLIC BLOOD PRESSURE: 120 MMHG | HEIGHT: 62 IN | WEIGHT: 160.63 LBS | OXYGEN SATURATION: 98 %

## 2017-07-24 DIAGNOSIS — F17.200 TOBACCO USE DISORDER: ICD-10-CM

## 2017-07-24 DIAGNOSIS — R06.02 SOB (SHORTNESS OF BREATH) ON EXERTION: Primary | ICD-10-CM

## 2017-07-24 PROCEDURE — 99214 OFFICE O/P EST MOD 30 MIN: CPT | Mod: PBBFAC | Performed by: NURSE PRACTITIONER

## 2017-07-24 PROCEDURE — 99999 PR PBB SHADOW E&M-EST. PATIENT-LVL IV: CPT | Mod: PBBFAC,,, | Performed by: NURSE PRACTITIONER

## 2017-07-24 PROCEDURE — 1159F MED LIST DOCD IN RCRD: CPT | Mod: ,,, | Performed by: NURSE PRACTITIONER

## 2017-07-24 PROCEDURE — 99214 OFFICE O/P EST MOD 30 MIN: CPT | Mod: S$PBB,,, | Performed by: NURSE PRACTITIONER

## 2017-07-24 RX ORDER — ALBUTEROL SULFATE 90 UG/1
2 AEROSOL, METERED RESPIRATORY (INHALATION) EVERY 4 HOURS PRN
COMMUNITY
End: 2017-08-15 | Stop reason: SDUPTHER

## 2017-07-24 NOTE — PROGRESS NOTES
Consult    Subjective:      Patient ID: Kia Smith is a 67 y.o. female.    Patient Active Problem List   Diagnosis    Chest pain    Nicotine abuse    SOB (shortness of breath) on exertion    Tobacco use disorder     Problem list has been reviewed.    she has been referred by Wyatt Marin NP for evaluation and management for   Chief Complaint   Patient presents with    Shortness of Breath     with exertion     Chief Complaint: Shortness of Breath (with exertion)    HPI:  Patient presents to pulmonary clinic related to complaint of shortness of breath intermittent with exertion.   She reports at her follow up cardiology visit she reported she had some intermittent shortness of breath with exertion.  She reports at  this visit shortness of breath with exertion is very rare.   She reports her ER visit was related to anxiety after receiving a letter from [employer] prior her ER visit on 7/10/2017 when she complained of chest pain w/sob.   Patient is current everyday smoker 3/4 pack day over the past 2 weeks. Prior smoking 1 pack a day since age 18 yrs.   She is interested in quitting, but not sure she is ready at this time for complete cessation.  She is willing to join smoking cessation program in effort to quit.   She has never been diagnosis with COPD in past, no pft testing in past.  She has Albuterol inhaler on hand that was last used 6-8 months ago when she had a bronchitis flare with wheezing.  She flares with bronchitis about twice a year. If she is not having flares of bronchitis she does not wheeze.    Previous Report Reviewed: lab reports, office notes and radiology reports     Past Medical History: The following portions of the patient's history were reviewed and updated as appropriate:   She  has a past surgical history that includes Hysterectomy; Appendectomy; and Cholecystectomy.  Her family history includes Cancer in her father; Heart attack in her mother; Stroke in her mother.  She   reports that she has been smoking Cigarettes.  She started smoking about 49 years ago. She has been smoking about 1.00 pack per day. She has never used smokeless tobacco. She reports that she does not drink alcohol or use drugs.  She has a current medication list which includes the following prescription(s): albuterol.  She is allergic to demerol [meperidine] and pcn [penicillins]..    Review of Systems   Constitutional: Negative for fever, chills, weight loss, weight gain, fatigue and night sweats.   HENT: Negative for postnasal drip, rhinorrhea, sinus pressure, voice change and congestion.    Eyes: Negative for redness and itching.   Respiratory: Positive for dyspnea on extertion. Negative for snoring, shortness of breath, orthopnea, asthma nighttime symptoms and somnolence.    Cardiovascular: Negative.    Genitourinary: Negative for difficulty urinating and hematuria.   Endocrine: Negative for polydipsia, polyphagia and polyuria.    Musculoskeletal: Negative for arthralgias, back pain, gait problem, joint swelling and myalgias.   Skin: Negative.    Gastrointestinal: Negative.  Negative for nausea, vomiting and abdominal pain.   Neurological: Negative.  Negative for dizziness, weakness, light-headedness and headaches.   Hematological: Negative for adenopathy. No excessive bruising.   Psychiatric/Behavioral: Negative for sleep disturbance. The patient is not nervous/anxious.         Objective:     Physical Exam   Constitutional: She is oriented to person, place, and time. Vital signs are normal. She appears well-developed and well-nourished. She is active and cooperative.  Non-toxic appearance. She does not appear ill. No distress.   HENT:   Head: Normocephalic.   Right Ear: External ear normal.   Left Ear: External ear normal.   Nose: Nose normal.   Mouth/Throat: Oropharynx is clear and moist. No oropharyngeal exudate.   Eyes: Conjunctivae are normal.   Neck: Normal range of motion. Neck supple.   Cardiovascular:  "Normal rate, regular rhythm, normal heart sounds and intact distal pulses.    Pulmonary/Chest: Effort normal and breath sounds normal. She has no wheezes. She has no rales.   Abdominal: Soft. Bowel sounds are normal.   Musculoskeletal: Normal range of motion. She exhibits no edema.   Neurological: She is alert and oriented to person, place, and time.   Skin: Skin is warm and dry.   Psychiatric: She has a normal mood and affect. Her behavior is normal. Judgment and thought content normal.     Vitals:    07/24/17 1332   BP: 120/70   Pulse: 80   Resp: (!) 22   SpO2: 98%   Weight: 72.8 kg (160 lb 9.7 oz)   Height: 5' 2.4" (1.585 m)     Estimated body mass index is 29 kg/m² as calculated from the following:    Height as of this encounter: 5' 2.4" (1.585 m).    Weight as of this encounter: 72.8 kg (160 lb 9.7 oz).    Personal Diagnostic Review  Chest x-ray: 7/10/2017  EXAM:   XR CHEST AP PORTABLE  CLINICAL HISTORY:   Other chest pain  COMPARISON:  01/28/2017  FINDINGS:   Heart size and pulmonary vascularity appear normal. Lungs are well aerated and appear clear. No suspicious mass, infiltrate or effusion.  Benign left hilar calcification consistent with granulomatous disease, similar to before   Impression         No acute findings      Assessment:     1. SOB (shortness of breath) on exertion    2. Tobacco use disorder      Orders Placed This Encounter   Procedures    CT Chest Lung Screening Low Dose     Schedule follow up office visit after test.     Standing Status:   Future     Standing Expiration Date:   7/24/2018     Order Specific Question:   Reason for Exam:     Answer:   pulmonary nodule    Ambulatory referral to Smoking Cessation Program     Referral Priority:   Routine     Referral Type:   Consultation     Referral Reason:   Specialty Services Required     Requested Specialty:   CTTS     Number of Visits Requested:   1    Complete PFT with bronchodilator     Standing Status:   Future     Standing Expiration " Date:   7/24/2018    Stress test, pulmonary     Standing Status:   Future     Standing Expiration Date:   7/24/2018     Plan:     Discussed diagnosis, its evaluation, treatment and usual course. All questions answered.    SOB (shortness of breath) on exertion  -     Complete PFT with bronchodilator; Future  -     Stress test, pulmonary; Future; Expected date: 07/24/2017  -     CT Chest Lung Screening Low Dose; Future; Expected date: 07/24/2017    Tobacco use disorder  -     Complete PFT with bronchodilator; Future  -     CT Chest Lung Screening Low Dose; Future; Expected date: 07/24/2017  -     Ambulatory referral to Smoking Cessation Program        Return for Shortness of Breath, w/review cpft/pul stress.

## 2017-07-24 NOTE — LETTER
July 24, 2017      Wyatt Marin NP  [address] [facility] [address]           [facility]  [address] [place] [address]  Phone: [phone]  Fax: [phone]          Patient: Kia Smith   MR Number: [id number]   YOB: 1950   Date of Visit: 7/24/2017       Dear Wyatt Marin:    Thank you for referring Kia Smith to me for evaluation. Attached you will find relevant portions of my assessment and plan of care.    If you have questions, please do not hesitate to call me. I look forward to following Kia Smith along with you.    Sincerely,    Renée Martin, MANISHA    Enclosure  CC:  No Recipients    If you would like to receive this communication electronically, please contact [email or web address] or [phone] to request more information on [email or web address] Link access.    For providers and/or their staff who would like to refer a patient to [facility], please contact us through our one-stop-shop provider referral line, [facility], at [phone].    If you feel you have received this communication in error or would no longer like to receive these types of communications, please e-mail [email or web address]

## 2017-07-27 NOTE — DISCHARGE SUMMARY
[facility] Hospital Medicine  Discharge Summary      Patient Name: Kia Smith  MRN: [id number]  Admission Date: 7/10/2017  Hospital Length of Stay: 0 days  Discharge Date and Time: 7/11/2017  5:44 PM  Attending Physician: Sekou Mishra   Discharging Provider: GIANFRANCO Marmolejo  Primary Care Provider: Primary Doctor No      HPI:   The patient is a 67-year old female presenting with sudden onset of severe retrosternal chest pressure that started about 6pm while watching television. She states she is under a lot of stress and lives with her daughter and three grandchildren. She states that over more than a week she had been having intermittent chest pressure that was however mild compared with this one. She is presently pain free. She denes any cough, hemoptysis, chills, fever or wheezing.  Hospital Medicine consulted for admission.  Positive family h/o CAD.    * No surgery found *      Indwelling Lines/Drains at time of discharge:   Lines/Drains/Airways          No matching active lines, drains, or airways        Hospital Course:   Patient was admitted to observation and AMI r/o with negative serial cardiac enzymes and unrevealing EKG.  Cardiology consulted.  Patient with multiple CAD risk factors.  2D echo performed, which showed LVEF of 60-65% with no valvular abnormalities.  Patient underwent MPI stress test, which showed no evidence of ischemia.  Case discussed with Cardiology.  Chest pain felt to be stress related.  Patient set-up with Cardiology f/u within 1 week.  Patient counseled on smoking cessation.  Patient appears comfortable without complaints.  Denies any further chest pain.  VS and labs stable.  Patient seen and examined today, and deemed suitable for discharge.      Consults:   Consults         Status Ordering Provider     Inpatient consult to Cardiology  Once     Provider:  Jermaine Bryant MD    Completed BOBY GONZALEZ          Significant Diagnostic Studies:  Labs: All labs within the past 24 hours have been reviewed    Imaging Results          Exercise NM Myocardial Perfusion Spect Multi (Final result)  Result time 07/13/17 13:27:43    Final result by KENYATTA CALERO (07/13/17 13:27:43)                 Impression:     Please see cardiology CVIS report of same date for interpretation..      Electronically signed by: KENYATTA RADIOLOGIST  Date:     07/13/17  Time:    13:27              Narrative:    Please see cardiology CVIS report of same date for interpretation.                             X-Ray Chest AP Portable (Final result)  Result time 07/10/17 21:06:35    Final result by Maico Rebolledo Jr., MD (07/10/17 21:06:35)                 Impression:          No acute findings       Electronically signed by: MAICO REBOLLEDO MD  Date:     07/10/17  Time:    21:06              Narrative:    EXAM:   XR CHEST AP PORTABLE    CLINICAL HISTORY:   Other chest pain    COMPARISON:  01/28/2017    FINDINGS:   Heart size and pulmonary vascularity appear normal. Lungs are well aerated and appear clear. No suspicious mass, infiltrate or effusion.  Benign left hilar calcification consistent with granulomatous disease, similar to before                              Pending Diagnostic Studies:     None        Final Active Diagnoses:    Diagnosis Date Noted POA    PRINCIPAL PROBLEM:  Chest pain [R07.9] 07/11/2017 Yes    Nicotine abuse [Z72.0] 07/11/2017 Yes      Problems Resolved During this Admission:    Diagnosis Date Noted Date Resolved POA        Discharged Condition: stable    Disposition: Home or Self Care    Follow Up:  Follow-up Information     [facility]. Schedule an appointment as soon as possible for a visit in 2 days.    Specialty:  Cardiology  Why:  Return to the ED for:  chest pain, chest pressure, shortness of breath, weakness, breaking out in sweats or other concerns.  Contact information:  [address]  [facility]  [address]  [phone]           Primary Care Provider.    Why:  Establish care with Primary Care Provider.               Patient Instructions:     Diet general     Activity as tolerated       Medications:  Reconciled Home Medications:   Discharge Medication List as of 7/11/2017  5:10 PM      CONTINUE these medications which have CHANGED    Details   famotidine (PEPCID) 20 MG tablet Take 1 tablet (20 mg total) by mouth 2 (two) times daily., Starting Tue 7/11/2017, Until Thu 8/10/2017, Print         STOP taking these medications       hydrocodone-chlorpheniramine (TUSSIONEX) 10-8 mg/5 mL suspension Comments:   Reason for Stopping:             Time spent on the discharge of patient: 35 minutes      GIANFRANCO Marmolejo  Department of Hospital Medicine  [facility]

## 2017-07-27 NOTE — HOSPITAL COURSE
Patient was admitted to observation and AMI r/o with negative serial cardiac enzymes and unrevealing EKG.  Cardiology consulted.  Patient with multiple CAD risk factors.  2D echo performed, which showed LVEF of 60-65% with no valvular abnormalities.  Patient underwent MPI stress test, which showed no evidence of ischemia.  Case discussed with Cardiology.  Chest pain felt to be stress related.  Patient set-up with Cardiology f/u within 1 week.  Patient counseled on smoking cessation.  Patient appears comfortable without complaints.  Denies any further chest pain.  VS and labs stable.  Patient seen and examined today, and deemed suitable for discharge.

## 2017-08-11 ENCOUNTER — TELEPHONE (OUTPATIENT)
Dept: RADIOLOGY | Facility: HOSPITAL | Age: 67
End: 2017-08-11

## 2017-08-14 ENCOUNTER — HOSPITAL ENCOUNTER (OUTPATIENT)
Dept: RADIOLOGY | Facility: HOSPITAL | Age: 67
Discharge: HOME | End: 2017-08-14
Attending: NURSE PRACTITIONER

## 2017-08-14 DIAGNOSIS — F17.200 TOBACCO USE DISORDER: ICD-10-CM

## 2017-08-14 DIAGNOSIS — R06.02 SOB (SHORTNESS OF BREATH) ON EXERTION: ICD-10-CM

## 2017-08-14 PROCEDURE — 76497 UNLISTED CT PROCEDURE: CPT | Mod: TC,PO

## 2017-08-15 ENCOUNTER — OFFICE VISIT (OUTPATIENT)
Dept: PULMONOLOGY | Facility: CLINIC | Age: 67
End: 2017-08-15
Payer: MEDICARE

## 2017-08-15 ENCOUNTER — PROCEDURE VISIT (OUTPATIENT)
Dept: PULMONOLOGY | Facility: CLINIC | Age: 67
End: 2017-08-15
Payer: MEDICARE

## 2017-08-15 VITALS
OXYGEN SATURATION: 98 % | SYSTOLIC BLOOD PRESSURE: 110 MMHG | DIASTOLIC BLOOD PRESSURE: 70 MMHG | WEIGHT: 159.81 LBS | WEIGHT: 160.5 LBS | HEART RATE: 76 BPM | HEIGHT: 62 IN | BODY MASS INDEX: 29.53 KG/M2 | BODY MASS INDEX: 29.41 KG/M2 | HEIGHT: 62 IN | RESPIRATION RATE: 22 BRPM

## 2017-08-15 DIAGNOSIS — J84.10 CALCIFIED GRANULOMA OF LUNG: Chronic | ICD-10-CM

## 2017-08-15 DIAGNOSIS — F17.200 TOBACCO USE DISORDER: ICD-10-CM

## 2017-08-15 DIAGNOSIS — J44.9 STAGE 1 MILD COPD BY GOLD CLASSIFICATION: Primary | ICD-10-CM

## 2017-08-15 DIAGNOSIS — R91.8 PULMONARY NODULES: ICD-10-CM

## 2017-08-15 DIAGNOSIS — R06.02 SOB (SHORTNESS OF BREATH) ON EXERTION: ICD-10-CM

## 2017-08-15 PROBLEM — J98.4 CALCIFIED GRANULOMA OF LUNG: Chronic | Status: ACTIVE | Noted: 2017-08-15

## 2017-08-15 LAB
POST FEF 25 75: 0.88 L/S (ref 1.38–2.54)
POST FET 100: 12.11 S
POST FEV1 FVC: 67 %
POST FEV1: 1.79 L (ref 1.91–2.46)
POST FIF 50: 2.02 L/S
POST FVC: 2.66 L (ref 2.54–3.19)
POST PEF: 4.87 L/S (ref 4.8–6.4)
PRE DLCO: 16.26 ML/MMHG/MIN (ref 15.53–23.82)
PRE ERV: 0.69 L
PRE FEF 25 75: 1.24 L/S (ref 1.38–2.54)
PRE FET 100: 9.24 S
PRE FEV1 FVC: 71 %
PRE FEV1: 1.79 L (ref 1.91–2.46)
PRE FIF 50: 2.96 L/S
PRE FRC PL: 2.47 L (ref 1.92–2.87)
PRE FVC: 2.51 L (ref 2.54–3.19)
PRE KROGHS K: 3.15 1/MIN
PRE PEF: 4.36 L/S (ref 4.8–6.4)
PRE RV: 1.76 L (ref 1.38–2.09)
PRE SVC: 3.18 L
PRE TLC: 4.94 L (ref 4.06–4.83)
PREDICTED DLCO: 19.67 ML/MMHG/MIN (ref 15.53–23.82)
PREDICTED FEV1 FVC: 76.78 % (ref 71.89–81.68)
PREDICTED FEV1: 2.18 L (ref 1.91–2.46)
PREDICTED FRC N2: 2.4 L (ref 1.92–2.87)
PREDICTED FRC PL: 2.4 L (ref 1.92–2.87)
PREDICTED FVC: 2.87 L (ref 2.54–3.19)
PREDICTED RV: 1.73 L (ref 1.38–2.09)
PREDICTED SVC: 2.66 L
PREDICTED TLC: 4.44 L (ref 4.06–4.83)
PROVOCATION PROTOCOL: ABNORMAL

## 2017-08-15 PROCEDURE — 94729 DIFFUSING CAPACITY: CPT | Mod: 26,S$PBB,, | Performed by: INTERNAL MEDICINE

## 2017-08-15 PROCEDURE — 94726 PLETHYSMOGRAPHY LUNG VOLUMES: CPT | Mod: PBBFAC,PO | Performed by: GENERAL PRACTICE

## 2017-08-15 PROCEDURE — 99214 OFFICE O/P EST MOD 30 MIN: CPT | Mod: 25,S$PBB,, | Performed by: NURSE PRACTITIONER

## 2017-08-15 PROCEDURE — 94620 PR PULMONARY STRESS TESTING,SIMPLE: CPT | Mod: 26,S$PBB,, | Performed by: INTERNAL MEDICINE

## 2017-08-15 PROCEDURE — 94729 DIFFUSING CAPACITY: CPT | Mod: PBBFAC,PO | Performed by: GENERAL PRACTICE

## 2017-08-15 PROCEDURE — 94726 PLETHYSMOGRAPHY LUNG VOLUMES: CPT | Mod: 26,S$PBB,, | Performed by: INTERNAL MEDICINE

## 2017-08-15 PROCEDURE — 94620 PR PULMONARY STRESS TESTING,SIMPLE: CPT | Mod: PBBFAC,PO | Performed by: GENERAL PRACTICE

## 2017-08-15 PROCEDURE — 94060 EVALUATION OF WHEEZING: CPT | Mod: 26,59,S$PBB, | Performed by: INTERNAL MEDICINE

## 2017-08-15 PROCEDURE — 99999 PR PBB SHADOW E&M-EST. PATIENT-LVL III: CPT | Mod: PBBFAC,,, | Performed by: NURSE PRACTITIONER

## 2017-08-15 PROCEDURE — 99213 OFFICE O/P EST LOW 20 MIN: CPT | Mod: PBBFAC,PO | Performed by: NURSE PRACTITIONER

## 2017-08-15 PROCEDURE — 94060 EVALUATION OF WHEEZING: CPT | Mod: PBBFAC,PO | Performed by: GENERAL PRACTICE

## 2017-08-15 PROCEDURE — 1159F MED LIST DOCD IN RCRD: CPT | Mod: ,,, | Performed by: NURSE PRACTITIONER

## 2017-08-15 PROCEDURE — 94727 GAS DIL/WSHOT DETER LNG VOL: CPT | Mod: PBBFAC,PO | Performed by: GENERAL PRACTICE

## 2017-08-15 RX ORDER — ALBUTEROL SULFATE 90 UG/1
2 AEROSOL, METERED RESPIRATORY (INHALATION) EVERY 4 HOURS PRN
Qty: 18 G | Refills: 11 | Status: SHIPPED | OUTPATIENT
Start: 2017-08-15 | End: 2019-09-27

## 2017-08-15 NOTE — ASSESSMENT & PLAN NOTE
Current every day smoker down from pack/day to 3/4 pack over past several weeks.  Enrolling in smoking cessation next week.  Complete cessation strongly advised.

## 2017-08-15 NOTE — PROGRESS NOTES
"Subjective:       Kia Smith is a 67 y.o. female  for reevaluation of COPD with review of cpft, pulmonary stress and CT scan chest low dose screening related to current every day smoker.  The patient is not currently have symptoms or exacerbation, her prior complaint of shortness of breath with exertion is rare and precipitated by walking outside in heat. No shortness of breath if walking indoors.   The patient has bronchitis flares with wheezing about 1-2 times a year long standing.   Last flare of bronchitis with wheezing Feb 2017.   Symptoms in previous episodes have included dyspnea, cough, wheezing, fatigue, increased sputum and colored sputum, and typically last 3 - 5 weeks.   Previous episodes have been exacerbated by uri.   Current treatment includes albuterol inhaler she has on hand for if needed, last used Feb 2017 when she had a bronchitis flare with wheezing.  She uses 2-3 pillows at night for comfort.   Patient currently is not on home oxygen therapy.   The patient is having no constitutional symptoms, denying fever, chills, anorexia, or weight loss.   The patient has not been hospitalized for this condition before.  She currently smokes 3/4 pack packs per day. Beginning [facility] smoking cessation program next week.   The patient has no exercise limitations.  CAT score: 8    Previous Report Reviewed: lab reports, office notes and radiology reports     The following portions of the patient's history were reviewed and updated as appropriate: allergies, current medications, past family history, past medical history, past social history, past surgical history and problem list.    Review of Systems  A comprehensive review of systems was negative except for: Respiratory: positive for cough, sputum and clear sputum over past week.        Objective:      /70 (BP Location: Right arm, Patient Position: Sitting)   Pulse 76   Resp (!) 22   Ht 5' 2.4" (1.585 m)   Wt 72.5 kg (159 lb 13.3 oz)  "  SpO2 98%   BMI 28.86 kg/m²   General appearance: alert, appears stated age and cooperative  Head: Normocephalic, without obvious abnormality, atraumatic  Eyes: negative  Ears: normal TM's and external ear canals both ears  Nose: Nares normal. Septum midline. Mucosa normal. No drainage or sinus tenderness.  Throat: lips, mucosa, and tongue normal; teeth and gums normal  Neck: no adenopathy, no carotid bruit, no JVD, supple, symmetrical, trachea midline and thyroid not enlarged, symmetric, no tenderness/mass/nodules  Lungs: clear to auscultation bilaterally  Heart: regular rate and rhythm, S1, S2 normal, no murmur, click, rub or gallop  Abdomen: soft, non-tender; bowel sounds normal; no masses,  no organomegaly  Extremities: extremities normal, atraumatic, no cyanosis or edema  Pulses: 2+ and symmetric  Skin: Skin color, texture, turgor normal. No rashes or lesions  Lymph nodes: Cervical, supraclavicular, and axillary nodes normal.  Neurologic: Grossly normal    Diagnostic Review  Pulmonary function tests: 8/15/2017 FEV1: 1.79  (82 % predicted), FVC:  2.51 (87 % predicted), FEV1/FVC:  71 (93 % predicted), T.94 (111 % predicted), RV/TLVC: 36 (91 % predicted), DLCO: 16.3 (83 % predicted)   Post bronchodilator: FEV1: 1.79  (82 % predicted), FVC:  2.66 (93 % predicted), FEV1/FVC:  67 (87 % predicted).   Mild obstructive airflow defect. No bronchodilator response.  Normal LV. Normal DLCO     Pulmonary stress/six minute walk done 8/15/2017: No desaturations requiring supplemental oxygen.  Phase Oxygen Assessment Supplemental O2 Heart   Rate Blood Pressure Maribell Dyspnea Scale Rating   Resting 97 % Room Air 86 bpm 141/62   Exercise        Minute        1 94 % Room Air 102 bpm     2 97 % Room Air 102 bpm     3 97 % Room Air 102 bpm     4 97 % Room Air 102 bpm     5 97 % Room Air 107 bpm     6  97 % Room Air 97 bpm 110/67    Recovery        Minute        1 97 % Room Air 86 bpm     2 97 % Room Air 81 bpm     3 96 % Room  Air 80 bpm     4 96 % Room Air 83 bpm 126/72     CT chest 8/14/2017   Technique: CT of the thorax was performed with low dose, lung screening protocol.  No contrast was administered.  Sagittal and coronal reconstructions were obtained.  Comparison: None.  Findings:  Lungs: There are several punctate noncalcified solid appearing pulmonary nodules in the left lung measuring up to 2.6 mm as seen in the left upper lobe on image 26, series 3 and left lower lobe on image 57, series 3.  Calcified granuloma also noted in the left lower lobe medially. The lungs show no findings consistent with emphysema.  Pleura: No effusion.  Mediastinum: No pathologic lymphadenopathy.  Several calcified left perihilar lymph nodes noted.  Heart and pericardium: Normal size without effusion.  Aorta and vasculature: Atherosclerosis including coronary arteries.  Chest wall and skeletal structures: Unremarkable except age-appropriate degenerative changes.  Upper abdomen: Unremarkable.   Impression     Small noncalcified pulmonary nodules in the left lung measuring up to 2.6 mm.  1 year followup CT recommended.  Other findings as above.    Electronically signed by: CARO HER MD  Date: 08/14/17  Time: 14:33        Assessment:             1. Stage 1 mild COPD by GOLD classification  albuterol (VENTOLIN HFA) 90 mcg/actuation inhaler    Spirometry with/without bronchodilator   2. Pulmonary nodules  CT Chest With Contrast    Creatinine, serum   3. Tobacco use disorder     4. SOB (shortness of breath) on exertion     5. Calcified granuloma of lung         Plan:   Discussed diagnosis, its evaluation, treatment and usual course.  All questions answered.  Problem List Items Addressed This Visit     Calcified granuloma of lung (Chronic)     Seen on low dose screen CT of chest 8/14/2017: Calcified granuloma also noted in the left lower lobe medially.            Pulmonary nodules     Seen on low dose screening CT of chest 8/14/2017  Small noncalcified  pulmonary nodules in the left lung measuring up to 2.6 mm.    1 year followup CT recommended and scheduled.          Relevant Orders    CT Chest With Contrast    Creatinine, serum    RESOLVED: SOB (shortness of breath) on exertion     Rare only if walking outside in heat.  Advised to use albuterol inhaler 5-15 minutes before expected exertion out doors.          Stage 1 mild COPD by GOLD classification - Primary     Cpft 8/15/2017 FEV1: 1.79  (82 % predicted). mild COPD gold classification.   No indication for controller medication w/symptoms only with acute bronchitis flare about once a year.   Continue albuterol if needed bronchitis flares.  Refills provided.   Return to pulmonary clinic in one year for repeat spirometry.  Return sooner for any acute flares or new onset of symptoms.  Stop smoking. Enrolled in smoking cessation 8/2017.         Relevant Medications    albuterol (VENTOLIN HFA) 90 mcg/actuation inhaler    Other Relevant Orders    Spirometry with/without bronchodilator    Tobacco use disorder     Current every day smoker down from pack/day to 3/4 pack over past several weeks.  Enrolling in smoking cessation next week.  Complete cessation strongly advised.           Other Visit Diagnoses    None.            Return in about 1 year (around 8/15/2018) for COPD 1 year follow up, w/review blessing. Pulmonary nodule 1 yr follow up CT chest w/contrast.

## 2017-08-15 NOTE — ASSESSMENT & PLAN NOTE
Seen on low dose screening CT of chest 8/14/2017  Small noncalcified pulmonary nodules in the left lung measuring up to 2.6 mm.    1 year followup CT recommended and scheduled.

## 2017-08-15 NOTE — ASSESSMENT & PLAN NOTE
Cpft 8/15/2017 FEV1: 1.79  (82 % predicted). mild COPD gold classification.   No indication for controller medication w/symptoms only with acute bronchitis flare about once a year.   Continue albuterol if needed bronchitis flares.  Refills provided.   Return to pulmonary clinic in one year for repeat spirometry.  Return sooner for any acute flares or new onset of symptoms.  Stop smoking. Enrolled in smoking cessation 8/2017.

## 2017-08-15 NOTE — ASSESSMENT & PLAN NOTE
Rare only if walking outside in heat.  Advised to use albuterol inhaler 5-15 minutes before expected exertion out doors.

## 2017-08-15 NOTE — ASSESSMENT & PLAN NOTE
Seen on low dose screen CT of chest 8/14/2017: Calcified granuloma also noted in the left lower lobe medially.

## 2017-08-15 NOTE — PROCEDURES
"[facility]  Six Minute Walk     SUMMARY     Ordering Provider: YANCY Martin      Performing nurse/tech/RT: JANICE Goldstein  Diagnosis: Shortness of Breath  Height: 5' 2" (157.5 cm)  Weight: 72.8 kg (160 lb 7.9 oz)  BMI (Calculated): 29.4   Patient Race:             Phase Oxygen Assessment Supplemental O2 Heart   Rate Blood Pressure Maribell Dyspnea Scale Rating   Resting 97 % Room Air 86 bpm 141/62 0   Exercise        Minute        1 94 % Room Air 102 bpm     2 97 % Room Air 102 bpm     3 97 % Room Air 102 bpm     4 97 % Room Air 102 bpm     5 97 % Room Air 107 bpm     6  97 % Room Air 97 bpm 110/67 0   Recovery        Minute        1 97 % Room Air 86 bpm     2 97 % Room Air 81 bpm     3 96 % Room Air 80 bpm     4 96 % Room Air 83 bpm 126/72 0     Six Minute Walk Summary  6MWT Status: completed without stopping  Patient Reported: No complaints     Interpretation:  Did the patient stop or pause?: No        Total Time Walked (Calculated): 360 seconds  Final Partial Lap Distance (feet): 0 feet  Total Distance Meters (Calculated): 365.76 meters  Predicted Distance Meters (Calculated): 451.13 meters  Percentage of Predicted (Calculated): 81.08  Peak VO2 (Calculated): 14.95  Mets: 4.27  Has The Patient Had a Previous Six Minute Walk Test?: No       Previous 6MWT Results  Has The Patient Had a Previous Six Minute Walk Test?: No      PHYSICIAN INTERPRETATION:    Six minute walk distance is 365.76 / 451.13 meters (81.08 % predicted) with no dyspnea. Peak VO2 during walking was 14.95  Ml/min/kg [ 4.27 METS]. During exercise, there was no significant desaturation while breathing room air.  Blood pressure decreased significantly and Heart rate increased significantly with walking. Hypertension was present prior to exercise. The patient did not report non-pulmonary symptoms during exercise. The patient did complete the study, walking 360 seconds of the 360 second test. No previous study performed. Based upon age " and body mass index, exercise capacity is normal.

## 2017-08-22 ENCOUNTER — TELEPHONE (OUTPATIENT)
Dept: SMOKING CESSATION | Facility: CLINIC | Age: 67
End: 2017-08-22

## 2017-08-22 NOTE — TELEPHONE ENCOUNTER
Left message with my name Alix Apodaca and phone number [phone] about missed appointment to start the tobacco cessation program.

## 2017-08-28 ENCOUNTER — TELEPHONE (OUTPATIENT)
Dept: SMOKING CESSATION | Facility: CLINIC | Age: 67
End: 2017-08-28

## 2017-08-28 NOTE — TELEPHONE ENCOUNTER
Left message with my name Alix Apodaca and phone number [phone] about missed appointment to start the tobacco cessation program.

## 2018-11-06 ENCOUNTER — HOSPITAL ENCOUNTER (EMERGENCY)
Facility: HOSPITAL | Age: 68
Discharge: HOME | End: 2018-11-06
Attending: EMERGENCY MEDICINE
Payer: MEDICARE

## 2018-11-06 VITALS
OXYGEN SATURATION: 97 % | WEIGHT: 159.81 LBS | HEIGHT: 62 IN | SYSTOLIC BLOOD PRESSURE: 163 MMHG | BODY MASS INDEX: 29.41 KG/M2 | HEART RATE: 87 BPM | DIASTOLIC BLOOD PRESSURE: 82 MMHG | TEMPERATURE: 99 F | RESPIRATION RATE: 20 BRPM

## 2018-11-06 DIAGNOSIS — R05.9 COUGH: ICD-10-CM

## 2018-11-06 DIAGNOSIS — B34.9 VIRAL SYNDROME: Primary | ICD-10-CM

## 2018-11-06 LAB
ALBUMIN SERPL BCP-MCNC: 4 G/DL
ALP SERPL-CCNC: 90 U/L
ALT SERPL W/O P-5'-P-CCNC: 24 U/L
ANION GAP SERPL CALC-SCNC: 10 MMOL/L
AST SERPL-CCNC: 22 U/L
BASOPHILS # BLD AUTO: 0.05 K/UL
BASOPHILS NFR BLD: 0.5 %
BILIRUB SERPL-MCNC: 0.6 MG/DL
BILIRUB UR QL STRIP: NEGATIVE
BUN SERPL-MCNC: 8 MG/DL
CALCIUM SERPL-MCNC: 10 MG/DL
CHLORIDE SERPL-SCNC: 103 MMOL/L
CLARITY UR: CLEAR
CO2 SERPL-SCNC: 25 MMOL/L
COLOR UR: YELLOW
CREAT SERPL-MCNC: 0.7 MG/DL
DIFFERENTIAL METHOD: NORMAL
EOSINOPHIL # BLD AUTO: 0.1 K/UL
EOSINOPHIL NFR BLD: 1.5 %
ERYTHROCYTE [DISTWIDTH] IN BLOOD BY AUTOMATED COUNT: 13.1 %
EST. GFR  (AFRICAN AMERICAN): >60 ML/MIN/1.73 M^2
EST. GFR  (NON AFRICAN AMERICAN): >60 ML/MIN/1.73 M^2
GLUCOSE SERPL-MCNC: 100 MG/DL
GLUCOSE UR QL STRIP: NEGATIVE
HCT VFR BLD AUTO: 42.8 %
HGB BLD-MCNC: 14.3 G/DL
HGB UR QL STRIP: ABNORMAL
INFLUENZA A, MOLECULAR: NEGATIVE
INFLUENZA B, MOLECULAR: NEGATIVE
KETONES UR QL STRIP: NEGATIVE
LEUKOCYTE ESTERASE UR QL STRIP: NEGATIVE
LYMPHOCYTES # BLD AUTO: 1.9 K/UL
LYMPHOCYTES NFR BLD: 19.4 %
MCH RBC QN AUTO: 29.5 PG
MCHC RBC AUTO-ENTMCNC: 33.4 G/DL
MCV RBC AUTO: 88 FL
MONOCYTES # BLD AUTO: 0.6 K/UL
MONOCYTES NFR BLD: 6.7 %
NEUTROPHILS # BLD AUTO: 6.9 K/UL
NEUTROPHILS NFR BLD: 71.9 %
NITRITE UR QL STRIP: NEGATIVE
PH UR STRIP: 7 [PH] (ref 5–8)
PLATELET # BLD AUTO: 184 K/UL
PMV BLD AUTO: 9.7 FL
POTASSIUM SERPL-SCNC: 4.3 MMOL/L
PROT SERPL-MCNC: 7.4 G/DL
PROT UR QL STRIP: NEGATIVE
RBC # BLD AUTO: 4.84 M/UL
SODIUM SERPL-SCNC: 138 MMOL/L
SP GR UR STRIP: 1.01 (ref 1–1.03)
SPECIMEN SOURCE: NORMAL
URN SPEC COLLECT METH UR: ABNORMAL
UROBILINOGEN UR STRIP-ACNC: NEGATIVE EU/DL
WBC # BLD AUTO: 9.54 K/UL

## 2018-11-06 PROCEDURE — 81003 URINALYSIS AUTO W/O SCOPE: CPT

## 2018-11-06 PROCEDURE — 87502 INFLUENZA DNA AMP PROBE: CPT

## 2018-11-06 PROCEDURE — 99284 EMERGENCY DEPT VISIT MOD MDM: CPT | Mod: 25

## 2018-11-06 PROCEDURE — 25000003 PHARM REV CODE 250: Performed by: EMERGENCY MEDICINE

## 2018-11-06 PROCEDURE — 80053 COMPREHEN METABOLIC PANEL: CPT

## 2018-11-06 PROCEDURE — 36415 COLL VENOUS BLD VENIPUNCTURE: CPT

## 2018-11-06 PROCEDURE — 85025 COMPLETE CBC W/AUTO DIFF WBC: CPT

## 2018-11-06 RX ORDER — PROMETHAZINE HYDROCHLORIDE AND DEXTROMETHORPHAN HYDROBROMIDE 6.25; 15 MG/5ML; MG/5ML
5 SYRUP ORAL EVERY 6 HOURS PRN
Qty: 120 ML | Refills: 0 | Status: SHIPPED | OUTPATIENT
Start: 2018-11-06 | End: 2018-11-16

## 2018-11-06 RX ADMIN — SODIUM CHLORIDE 1000 ML: 0.9 INJECTION, SOLUTION INTRAVENOUS at 03:11

## 2018-11-06 NOTE — ED PROVIDER NOTES
SCRIBE #1 NOTE: I, Lanashruthi Nazario, am scribing for, and in the presence of, Chay Saxena MD. I have scribed the entire note.      History      Chief Complaint   Patient presents with    General Illness     Pt reports sore thorat, chest congestion, SOB, and feels dehydrated        Review of patient's allergies indicates:   Allergen Reactions    Demerol [meperidine]     Pcn [penicillins]         HPI   HPI    11/6/2018, 2:07 PM   History obtained from the patient      History of Present Illness: iKa Smith is a 68 y.o. female patient who presents to the Emergency Department for sore throat which onset a few days ago. Symptoms are constant and moderate in severity.  No mitigating or exacerbating factors reported. Associated sxs include congestion, cough, and generalized myalgias. Pt states that she feels dehydrated. Patient denies any CP, SOB, n/v/d, abd pain, extremity weakness/numbness, dizziness, dysuria, hematuria, blood in stool, fever, chills, palpitations, diaphoresis, leg swelling, trouble swallowing, ear pain, and all other sxs at this time. No further complaints or concerns at this time.         Arrival mode: Personal vehicle     PCP: Primary Doctor No       Past Medical History:  Hx reviewed, not pertinent.     Past Surgical History:  Past Surgical History:   Procedure Laterality Date    APPENDECTOMY      CHOLECYSTECTOMY      HYSTERECTOMY           Family History:  Family History   Problem Relation Age of Onset    Stroke Mother     Heart attack Mother     Cancer Father        Social History:  Social History     Tobacco Use    Smoking status: Current Every Day Smoker     Packs/day: 1.00     Types: Cigarettes     Start date: 1968    Smokeless tobacco: Never Used   Substance and Sexual Activity    Alcohol use: No    Drug use: No    Sexual activity: Unknown       ROS   Review of Systems   Constitutional: Negative for chills, diaphoresis and fever.   HENT: Positive for  congestion and sore throat. Negative for ear pain and trouble swallowing.    Respiratory: Positive for cough. Negative for shortness of breath.    Cardiovascular: Negative for chest pain, palpitations and leg swelling.   Gastrointestinal: Negative for abdominal pain, blood in stool, constipation, diarrhea, nausea and vomiting.   Genitourinary: Negative for dysuria and hematuria.   Musculoskeletal: Positive for myalgias. Negative for back pain.   Skin: Negative for rash.   Neurological: Negative for dizziness, weakness and numbness.   Hematological: Does not bruise/bleed easily.   All other systems reviewed and are negative.      Physical Exam      Initial Vitals [11/06/18 1402]   BP Pulse Resp Temp SpO2   (!) 151/77 96 18 99.9 °F (37.7 °C) 95 %      MAP       --          Physical Exam  Nursing Notes and Vital Signs Reviewed.  Constitutional: Patient is in no acute distress. Well-developed and well-nourished.  Head: Atraumatic. Normocephalic.  Eyes: PERRL. EOM intact. Conjunctivae are not pale. No scleral icterus.  ENT: Moist mucous membranes. Posterior oropharynx is symmetric with mild erythema. Tonsillar exudate is not present. No trismus. Normal handling of secretions. No stridor.  Neck: Supple. Full ROM. No lymphadenopathy.  Cardiovascular: Regular rate. Regular rhythm. No murmurs, rubs, or gallops. Distal pulses are 2+ and symmetric.  Pulmonary/Chest: No respiratory distress. Clear to auscultation bilaterally. No wheezing or rales.  Abdominal: Soft and non-distended.  There is no tenderness.  No rebound, guarding, or rigidity.  Musculoskeletal: Moves all extremities. No obvious deformities. No edema. No calf tenderness.  Skin: Warm and dry.  Neurological:  Alert, awake, and appropriate.  Normal speech.  No acute focal neurological deficits are appreciated.  Psychiatric: Normal affect. Good eye contact. Appropriate in content.    ED Course    Procedures  ED Vital Signs:  Vitals:    11/06/18 1402   BP: (!) 151/77  "  Pulse: 96   Resp: 18   Temp: 99.9 °F (37.7 °C)   TempSrc: Oral   SpO2: 95%   Weight: 72.5 kg (159 lb 13.3 oz)   Height: 5' 2" (1.575 m)       Abnormal Lab Results:  Labs Reviewed   INFLUENZA A & B BY MOLECULAR   CBC W/ AUTO DIFFERENTIAL   COMPREHENSIVE METABOLIC PANEL   URINALYSIS, REFLEX TO URINE CULTURE        All Lab Results:  Results for orders placed or performed during the hospital encounter of 11/06/18   Influenza A & B by Molecular   Result Value Ref Range    Influenza A, Molecular Negative Negative    Influenza B, Molecular Negative Negative    Flu A & B Source NP    CBC auto differential   Result Value Ref Range    WBC 9.54 3.90 - 12.70 K/uL    RBC 4.84 4.00 - 5.40 M/uL    Hemoglobin 14.3 12.0 - 16.0 g/dL    Hematocrit 42.8 37.0 - 48.5 %    MCV 88 82 - 98 fL    MCH 29.5 27.0 - 31.0 pg    MCHC 33.4 32.0 - 36.0 g/dL    RDW 13.1 11.5 - 14.5 %    Platelets 184 150 - 350 K/uL    MPV 9.7 9.2 - 12.9 fL    Gran # (ANC) 6.9 1.8 - 7.7 K/uL    Lymph # 1.9 1.0 - 4.8 K/uL    Mono # 0.6 0.3 - 1.0 K/uL    Eos # 0.1 0.0 - 0.5 K/uL    Baso # 0.05 0.00 - 0.20 K/uL    Gran% 71.9 38.0 - 73.0 %    Lymph% 19.4 18.0 - 48.0 %    Mono% 6.7 4.0 - 15.0 %    Eosinophil% 1.5 0.0 - 8.0 %    Basophil% 0.5 0.0 - 1.9 %    Differential Method Automated    Comprehensive metabolic panel   Result Value Ref Range    Sodium 138 136 - 145 mmol/L    Potassium 4.3 3.5 - 5.1 mmol/L    Chloride 103 95 - 110 mmol/L    CO2 25 23 - 29 mmol/L    Glucose 100 70 - 110 mg/dL    BUN, Bld 8 8 - 23 mg/dL    Creatinine 0.7 0.5 - 1.4 mg/dL    Calcium 10.0 8.7 - 10.5 mg/dL    Total Protein 7.4 6.0 - 8.4 g/dL    Albumin 4.0 3.5 - 5.2 g/dL    Total Bilirubin 0.6 0.1 - 1.0 mg/dL    Alkaline Phosphatase 90 55 - 135 U/L    AST 22 10 - 40 U/L    ALT 24 10 - 44 U/L    Anion Gap 10 8 - 16 mmol/L    eGFR if African American >60 >60 mL/min/1.73 m^2    eGFR if non African American >60 >60 mL/min/1.73 m^2         Imaging Results:  Imaging Results          X-Ray Chest PA " And Lateral (Final result)  Result time 11/06/18 14:21:35    Final result by PHILIPP Hall Sr., MD (11/06/18 14:21:35)                 Impression:      1. The lungs are clear.  2. There are minimal degenerative changes in the thoracic spine.  3. There are surgical clips projected over the abdomen. This is seen on the lateral view.  .      Electronically signed by: Jayden Hall MD  Date:    11/06/2018  Time:    14:21             Narrative:    EXAMINATION:  XR CHEST PA AND LATERAL    CLINICAL HISTORY:  cough;    COMPARISON:  None    FINDINGS:  The size and contour of the heart are normal. The lungs are clear. There is no pneumothorax or pleural effusion.  There are minimal degenerative changes in the thoracic spine.  There are surgical clips projected over the abdomen.  This is seen on the lateral view.                                        The Emergency Provider reviewed the vital signs and test results, which are outlined above.    ED Discussion     3:50 PM: Reassessed pt at this time.  Pt is awake, alert, and in NAD at this time. Pt reports condition has improved after receiving IVF. Discussed with pt all pertinent ED information and results. Discussed pt dx and plan of tx. Gave pt all f/u and return to the ED instructions. All questions and concerns were addressed at this time. Pt expresses understanding of information and instructions, and is comfortable with plan to discharge. Pt is stable for discharge.    I discussed with patient and/or family/caretaker that evaluation in the ED does not suggest any emergent or life threatening medical conditions requiring immediate intervention beyond what was provided in the ED, and I believe patient is safe for discharge.  Regardless, an unremarkable evaluation in the ED does not preclude the development or presence of a serious of life threatening condition. As such, patient was instructed to return immediately for any worsening or change in current  symptoms.    Current Discharge Medication List      START taking these medications    Details   promethazine-dextromethorphan (PROMETHAZINE-DM) 6.25-15 mg/5 mL Syrp Take 5 mLs by mouth every 6 (six) hours as needed.  Qty: 120 mL, Refills: 0         CONTINUE these medications which have NOT CHANGED    Details   albuterol (VENTOLIN HFA) 90 mcg/actuation inhaler Inhale 2 puffs into the lungs every 4 (four) hours as needed for Wheezing or Shortness of Breath. Rescue  Qty: 18 g, Refills: 11    Associated Diagnoses: Stage 1 mild COPD by GOLD classification               ED Medication(s):  Medications   sodium chloride 0.9% bolus 1,000 mL (1,000 mLs Intravenous New Bag 11/6/18 3256)       Follow-up Information     [facility] In 2 days.    Contact information:  [address] [facility] [place] [address] [phone]                     Medical Decision Making    Medical Decision Making:   Clinical Tests:   Lab Tests: Ordered and Reviewed  Radiological Study: Ordered and Reviewed           Scribe Attestation:   Scribe #1: I performed the above scribed service and the documentation accurately describes the services I performed. I attest to the accuracy of the note.    Attending:   Physician Attestation Statement for Scribe #1: I, Chay Saxena MD, personally performed the services described in this documentation, as scribed by Lana Nazario, in my presence, and it is both accurate and complete.          Clinical Impression       ICD-10-CM ICD-9-CM   1. Viral syndrome B34.9 079.99   2. Cough R05 786.2       Disposition:   Disposition: Discharged  Condition: Stable         Chay Saxena MD  11/06/18 [id number]

## 2019-06-27 ENCOUNTER — HOSPITAL ENCOUNTER (EMERGENCY)
Facility: HOSPITAL | Age: 69
Discharge: HOME | End: 2019-06-27
Attending: FAMILY MEDICINE
Payer: MEDICARE

## 2019-06-27 VITALS
TEMPERATURE: 98 F | HEIGHT: 62 IN | RESPIRATION RATE: 18 BRPM | SYSTOLIC BLOOD PRESSURE: 116 MMHG | DIASTOLIC BLOOD PRESSURE: 64 MMHG | HEART RATE: 75 BPM | OXYGEN SATURATION: 97 % | BODY MASS INDEX: 29.41 KG/M2 | WEIGHT: 159.81 LBS

## 2019-06-27 DIAGNOSIS — R05.9 COUGH: ICD-10-CM

## 2019-06-27 DIAGNOSIS — M94.0 COSTOCHONDRITIS: Primary | ICD-10-CM

## 2019-06-27 LAB
ALBUMIN SERPL BCP-MCNC: 3.5 G/DL (ref 3.5–5.2)
ALP SERPL-CCNC: 90 U/L (ref 55–135)
ALT SERPL W/O P-5'-P-CCNC: 19 U/L (ref 10–44)
ANION GAP SERPL CALC-SCNC: 11 MMOL/L (ref 8–16)
AST SERPL-CCNC: 16 U/L (ref 10–40)
BASOPHILS # BLD AUTO: 0.05 K/UL (ref 0–0.2)
BASOPHILS NFR BLD: 0.5 % (ref 0–1.9)
BILIRUB SERPL-MCNC: 0.3 MG/DL (ref 0.1–1)
BNP SERPL-MCNC: <10 PG/ML (ref 0–99)
BUN SERPL-MCNC: 14 MG/DL (ref 8–23)
CALCIUM SERPL-MCNC: 9.1 MG/DL (ref 8.7–10.5)
CHLORIDE SERPL-SCNC: 105 MMOL/L (ref 95–110)
CO2 SERPL-SCNC: 24 MMOL/L (ref 23–29)
CREAT SERPL-MCNC: 0.7 MG/DL (ref 0.5–1.4)
DIFFERENTIAL METHOD: NORMAL
EOSINOPHIL # BLD AUTO: 0.2 K/UL (ref 0–0.5)
EOSINOPHIL NFR BLD: 2 % (ref 0–8)
ERYTHROCYTE [DISTWIDTH] IN BLOOD BY AUTOMATED COUNT: 13.5 % (ref 11.5–14.5)
EST. GFR  (AFRICAN AMERICAN): >60 ML/MIN/1.73 M^2
EST. GFR  (NON AFRICAN AMERICAN): >60 ML/MIN/1.73 M^2
GLUCOSE SERPL-MCNC: 119 MG/DL (ref 70–110)
HCT VFR BLD AUTO: 37.6 % (ref 37–48.5)
HGB BLD-MCNC: 12.4 G/DL (ref 12–16)
LYMPHOCYTES # BLD AUTO: 2.4 K/UL (ref 1–4.8)
LYMPHOCYTES NFR BLD: 24.2 % (ref 18–48)
MCH RBC QN AUTO: 29.7 PG (ref 27–31)
MCHC RBC AUTO-ENTMCNC: 33 G/DL (ref 32–36)
MCV RBC AUTO: 90 FL (ref 82–98)
MONOCYTES # BLD AUTO: 0.8 K/UL (ref 0.3–1)
MONOCYTES NFR BLD: 7.7 % (ref 4–15)
NEUTROPHILS # BLD AUTO: 6.4 K/UL (ref 1.8–7.7)
NEUTROPHILS NFR BLD: 65.8 % (ref 38–73)
PLATELET # BLD AUTO: 184 K/UL (ref 150–350)
PMV BLD AUTO: 9.6 FL (ref 9.2–12.9)
POTASSIUM SERPL-SCNC: 3.8 MMOL/L (ref 3.5–5.1)
PROT SERPL-MCNC: 6.3 G/DL (ref 6–8.4)
RBC # BLD AUTO: 4.18 M/UL (ref 4–5.4)
SODIUM SERPL-SCNC: 140 MMOL/L (ref 136–145)
TROPONIN I SERPL DL<=0.01 NG/ML-MCNC: <0.006 NG/ML (ref 0–0.03)
WBC # BLD AUTO: 9.78 K/UL (ref 3.9–12.7)

## 2019-06-27 PROCEDURE — 80053 COMPREHEN METABOLIC PANEL: CPT

## 2019-06-27 PROCEDURE — 84484 ASSAY OF TROPONIN QUANT: CPT

## 2019-06-27 PROCEDURE — 93010 EKG 12-LEAD: ICD-10-PCS | Mod: ,,, | Performed by: INTERNAL MEDICINE

## 2019-06-27 PROCEDURE — 36000 PLACE NEEDLE IN VEIN: CPT

## 2019-06-27 PROCEDURE — 85025 COMPLETE CBC W/AUTO DIFF WBC: CPT

## 2019-06-27 PROCEDURE — 93010 ELECTROCARDIOGRAM REPORT: CPT | Mod: ,,, | Performed by: INTERNAL MEDICINE

## 2019-06-27 PROCEDURE — 99285 EMERGENCY DEPT VISIT HI MDM: CPT | Mod: 25

## 2019-06-27 PROCEDURE — 93005 ELECTROCARDIOGRAM TRACING: CPT

## 2019-06-27 PROCEDURE — 83880 ASSAY OF NATRIURETIC PEPTIDE: CPT

## 2019-06-27 PROCEDURE — 36415 COLL VENOUS BLD VENIPUNCTURE: CPT

## 2019-06-27 RX ORDER — HYDROCODONE BITARTRATE AND ACETAMINOPHEN 10; 325 MG/1; MG/1
1 TABLET ORAL EVERY 4 HOURS PRN
Qty: 18 TABLET | Refills: 0 | Status: SHIPPED | OUTPATIENT
Start: 2019-06-27

## 2019-06-27 NOTE — ED PROVIDER NOTES
SCRIBE #1 NOTE: I, Haleigh Cruz, am scribing for, and in the presence of, Franci Mukherjee MD. I have scribed the entire note.       History     Chief Complaint   Patient presents with    Back Pain     cough     Review of patient's allergies indicates:   Allergen Reactions    Demerol [meperidine]     Pcn [penicillins]          History of Present Illness     HPI    6/27/2019, 1:21 AM  History obtained from the patient      History of Present Illness: Kia Smith is a 69 y.o. female patient who presents to the Emergency Department for evaluation of R side back pain which onset 45 PTA. Pt reports productive cough for about 1 month. Symptoms are constant and moderate in severity. No mitigating or exacerbating factors reported. No other sxs. Patient denies any fever, chills, neck pain, saddle anesthesia, bowel/bladder incontinence, and all other sxs at this time. Prior Tx includes Fentanyl per EMS. No further complaints or concerns at this time.       Arrival mode: EMS    PCP: Primary Doctor No        Past Medical History:  History reviewed. No pertinent past medical history.    Past Surgical History:  Past Surgical History:   Procedure Laterality Date    APPENDECTOMY      CHOLECYSTECTOMY      HYSTERECTOMY           Family History:  Family History   Problem Relation Age of Onset    Stroke Mother     Heart attack Mother     Cancer Father        Social History:  Social History     Tobacco Use    Smoking status: Current Every Day Smoker     Packs/day: 1.00     Types: Cigarettes     Start date: 1968    Smokeless tobacco: Never Used   Substance and Sexual Activity    Alcohol use: No    Drug use: No    Sexual activity: Unknown        Review of Systems     Review of Systems   Constitutional: Negative for fever.   HENT: Negative for sore throat.    Respiratory: Positive for cough (productive). Negative for shortness of breath.    Cardiovascular: Negative for chest pain.   Gastrointestinal: Negative  for nausea.   Genitourinary: Negative for dysuria.   Musculoskeletal: Positive for back pain (R side). Negative for neck pain.   Skin: Negative for rash.   Neurological: Negative for weakness.        - saddle anesthesia, bowel/bladder incontinence   Hematological: Does not bruise/bleed easily.   All other systems reviewed and are negative.       Physical Exam     Initial Vitals [06/27/19 0119]   BP Pulse Resp Temp SpO2   (!) 115/50 93 20 98 °F (36.7 °C) 98 %      MAP       --          Physical Exam  Nursing Notes and Vital Signs Reviewed.  Constitutional: Patient is in no acute distress. Well-developed and well-nourished.  Head: Atraumatic. Normocephalic.  Eyes: PERRL. EOM intact. Conjunctivae are not pale. No scleral icterus.  ENT: Mucous membranes are moist. Oropharynx is clear and symmetric.    Neck: Supple. Full ROM. No lymphadenopathy.  Back: Tenderness to R thoracic back. No midline bony tenderness, deformities, or step-offs of the T-spine or L-spine. Skin appears normal without abrasions or bruising. No erythema, induration, or fluctuance.   Cardiovascular: Regular rate. Regular rhythm. No murmurs, rubs, or gallops. Distal pulses are 2+ and symmetric.  Pulmonary/Chest: No respiratory distress. Clear to auscultation bilaterally. No wheezing or rales.  Abdominal: Soft and non-distended.  There is no tenderness.  No rebound, guarding, or rigidity. Good bowel sounds.  Genitourinary: No CVA tenderness  Musculoskeletal: Moves all extremities. No obvious deformities. No edema. No calf tenderness.  Skin: Warm and dry.  Neurological:  Alert, awake, and appropriate.  Normal speech.  No acute focal neurological deficits are appreciated.  Psychiatric: Normal affect. Good eye contact. Appropriate in content.     ED Course   Procedures  ED Vital Signs:  Vitals:    06/27/19 0119 06/27/19 0215 06/27/19 0315   BP: (!) 115/50 (!) 112/56 116/64   Pulse: 93 76 75   Resp: 20 17 18   Temp: 98 °F (36.7 °C)  97.9 °F (36.6 °C)  "  TempSrc: Oral  Oral   SpO2: 98% 97% 97%   Weight: 72.5 kg (159 lb 13.3 oz)     Height: 5' 2" (1.575 m)         Abnormal Lab Results:  Labs Reviewed   COMPREHENSIVE METABOLIC PANEL - Abnormal; Notable for the following components:       Result Value    Glucose 119 (*)     All other components within normal limits   CBC W/ AUTO DIFFERENTIAL   TROPONIN I   B-TYPE NATRIURETIC PEPTIDE   TROPONIN I   B-TYPE NATRIURETIC PEPTIDE        All Lab Results:  Results for orders placed or performed during the hospital encounter of 06/27/19   CBC auto differential   Result Value Ref Range    WBC 9.78 3.90 - 12.70 K/uL    RBC 4.18 4.00 - 5.40 M/uL    Hemoglobin 12.4 12.0 - 16.0 g/dL    Hematocrit 37.6 37.0 - 48.5 %    Mean Corpuscular Volume 90 82 - 98 fL    Mean Corpuscular Hemoglobin 29.7 27.0 - 31.0 pg    Mean Corpuscular Hemoglobin Conc 33.0 32.0 - 36.0 g/dL    RDW 13.5 11.5 - 14.5 %    Platelets 184 150 - 350 K/uL    MPV 9.6 9.2 - 12.9 fL    Gran # (ANC) 6.4 1.8 - 7.7 K/uL    Lymph # 2.4 1.0 - 4.8 K/uL    Mono # 0.8 0.3 - 1.0 K/uL    Eos # 0.2 0.0 - 0.5 K/uL    Baso # 0.05 0.00 - 0.20 K/uL    Gran% 65.8 38.0 - 73.0 %    Lymph% 24.2 18.0 - 48.0 %    Mono% 7.7 4.0 - 15.0 %    Eosinophil% 2.0 0.0 - 8.0 %    Basophil% 0.5 0.0 - 1.9 %    Differential Method Automated    Comprehensive metabolic panel   Result Value Ref Range    Sodium 140 136 - 145 mmol/L    Potassium 3.8 3.5 - 5.1 mmol/L    Chloride 105 95 - 110 mmol/L    CO2 24 23 - 29 mmol/L    Glucose 119 (H) 70 - 110 mg/dL    BUN, Bld 14 8 - 23 mg/dL    Creatinine 0.7 0.5 - 1.4 mg/dL    Calcium 9.1 8.7 - 10.5 mg/dL    Total Protein 6.3 6.0 - 8.4 g/dL    Albumin 3.5 3.5 - 5.2 g/dL    Total Bilirubin 0.3 0.1 - 1.0 mg/dL    Alkaline Phosphatase 90 55 - 135 U/L    AST 16 10 - 40 U/L    ALT 19 10 - 44 U/L    Anion Gap 11 8 - 16 mmol/L    eGFR if African American >60 >60 mL/min/1.73 m^2    eGFR if non African American >60 >60 mL/min/1.73 m^2   Troponin I   Result Value Ref Range    " Troponin I <0.006 0.000 - 0.026 ng/mL   Brain natriuretic peptide   Result Value Ref Range    BNP <10 0 - 99 pg/mL         Imaging Results:  Imaging Results          X-Ray Chest 1 View (Final result)  Result time 06/27/19 06:32:20    Final result by Nicolas Lama MD (06/27/19 06:32:20)                 Impression:      Question right basilar infiltrate. Recommend follow-up PA and lateral study.      Electronically signed by: Nicolas Lama MD  Date:    06/27/2019  Time:    06:32             Narrative:    EXAMINATION:  XR CHEST 1 VIEW    CLINICAL HISTORY:  Cough    FINDINGS:  Single view of the chest.  Comparison 11/06/2018.    Cardiac silhouette is normal.  Question right basilar infiltrate.  Recommend follow-up PA and lateral study..  No evidence of pleural effusion or pneumothorax.  Bones demonstrate moderate degenerative changes.                               Per ED physician, pt's CXR results NAF    The EKG was ordered, reviewed, and independently interpreted by the ED provider.  Interpretation time: 0153  Rate: 78 BPM  Rhythm: normal sinus rhythm  Interpretation: Low voltage QRS. No STEMI.         The Emergency Provider reviewed the vital signs and test results, which are outlined above.     ED Discussion     3:36 AM: Reassessed pt at this time. Pt is comfortably sleeping. Discussed with pt all pertinent ED information and results. Discussed pt dx and plan of tx. Gave pt all f/u and return to the ED instructions. All questions and concerns were addressed at this time. Pt expresses understanding of information and instructions, and is comfortable with plan to discharge. Pt is stable for discharge.    I discussed with patient and/or family/caretaker that evaluation in the ED does not suggest any emergent or life threatening medical conditions requiring immediate intervention beyond what was provided in the ED, and I believe patient is safe for discharge.  Regardless, an unremarkable evaluation in the ED does not  preclude the development or presence of a serious of life threatening condition. As such, patient was instructed to return immediately for any worsening or change in current symptoms.      ED Medication(s):  Medications - No data to display    Discharge Medication List as of 6/27/2019  3:31 AM      START taking these medications    Details   HYDROcodone-acetaminophen (NORCO)  mg per tablet Take 1 tablet by mouth every 4 (four) hours as needed., Starting u 6/27/2019, Print             Follow-up Information     Schedule an appointment as soon as possible for a visit  with Primary Doctor No.    Why:  As needed                       Medical Decision Making:   Clinical Tests:   Lab Tests: Ordered and Reviewed  Radiological Study: Ordered and Reviewed  Medical Tests: Reviewed and Ordered             Scribe Attestation:   Scribe #1: I performed the above scribed service and the documentation accurately describes the services I performed. I attest to the accuracy of the note.     Attending:   Physician Attestation Statement for Scribe #1: I, Franci Mukherjee MD, personally performed the services described in this documentation, as scribed by Haleigh Cruz, in my presence, and it is both accurate and complete.           Clinical Impression       ICD-10-CM ICD-9-CM   1. Costochondritis M94.0 733.6   2. Cough R05 786.2       Disposition:   Disposition: Discharged  Condition: Stable         Franci Mukherjee MD  06/30/19 2029

## 2019-09-27 ENCOUNTER — OFFICE VISIT (OUTPATIENT)
Dept: OPHTHALMOLOGY | Facility: CLINIC | Age: 69
End: 2019-09-27
Payer: MEDICARE

## 2019-09-27 DIAGNOSIS — H35.371 EPIRETINAL MEMBRANE (ERM) OF RIGHT EYE: ICD-10-CM

## 2019-09-27 DIAGNOSIS — H04.129 DRY EYE: ICD-10-CM

## 2019-09-27 DIAGNOSIS — H25.13 NUCLEAR SCLEROSIS OF BOTH EYES: Primary | ICD-10-CM

## 2019-09-27 PROCEDURE — 99999 PR PBB SHADOW E&M-EST. PATIENT-LVL II: CPT | Mod: PBBFAC,,, | Performed by: OPHTHALMOLOGY

## 2019-09-27 PROCEDURE — 92004 COMPRE OPH EXAM NEW PT 1/>: CPT | Mod: S$GLB,,, | Performed by: OPHTHALMOLOGY

## 2019-09-27 PROCEDURE — 92004 PR EYE EXAM, NEW PATIENT,COMPREHESV: ICD-10-PCS | Mod: S$GLB,,, | Performed by: OPHTHALMOLOGY

## 2019-09-27 PROCEDURE — 92134 CPTRZ OPH DX IMG PST SGM RTA: CPT | Mod: S$GLB,,, | Performed by: OPHTHALMOLOGY

## 2019-09-27 PROCEDURE — 99499 UNLISTED E&M SERVICE: CPT | Mod: S$GLB,,, | Performed by: OPHTHALMOLOGY

## 2019-09-27 PROCEDURE — 99999 PR PBB SHADOW E&M-EST. PATIENT-LVL II: ICD-10-PCS | Mod: PBBFAC,,, | Performed by: OPHTHALMOLOGY

## 2019-09-27 PROCEDURE — 92134 POSTERIOR SEGMENT OCT RETINA (OCULAR COHERENCE TOMOGRAPHY)-BOTH EYES: ICD-10-PCS | Mod: S$GLB,,, | Performed by: OPHTHALMOLOGY

## 2019-09-27 PROCEDURE — 99499 RISK ADDL DX/OHS AUDIT: ICD-10-PCS | Mod: S$GLB,,, | Performed by: OPHTHALMOLOGY

## 2019-09-27 NOTE — PROGRESS NOTES
SUBJECTIVE:   Kia Smith is a 69 y.o. female   Uncorrected distance visual acuity was 20/40 -1 in the right eye and 20/25 -2 in the left eye.   Chief Complaint   Patient presents with    Routine Eye Exam        HPI:  HPI     Pt here for full exam after experiencing subconj hemorrhage OS. Pt states   she hasn't had an appt in over 30 years, so she was hoping to have her   eyes checked. No     Last edited by José Manuel Monae, Patient Care Assistant on 9/27/2019 10:12   AM. (History)        Assessment /Plan :  1. Nuclear sclerosis of both eyes OS>OD monitor for now   2. Epiretinal membrane (ERM) of right eye monitor for now, discussed possible ERM peel in the future   3.      Dry eyes recommend artificial tears TID to QID OU    RTC in 6 months to evaluate the ERM OD with a MOCT

## 2020-03-25 ENCOUNTER — TELEPHONE (OUTPATIENT)
Dept: FAMILY MEDICINE | Facility: CLINIC | Age: 70
End: 2020-03-25

## 2020-03-25 ENCOUNTER — TELEPHONE (OUTPATIENT)
Dept: OTOLARYNGOLOGY | Facility: CLINIC | Age: 70
End: 2020-03-25

## 2020-03-25 NOTE — TELEPHONE ENCOUNTER
----- Message from Kami Decker sent at 3/25/2020  2:30 PM CDT -----  Contact: pt daughter   Stated she calling to schedule a new pt appointment for (marquise past four days) she can be reached at [phone] Thanks

## 2020-03-25 NOTE — TELEPHONE ENCOUNTER
----- Message from Nataliia Gant sent at 3/25/2020  2:06 PM CDT -----  Contact: Sharita /samy daughter   Caller request call back to schedule a new pt video visit; vertigo for 4 days  Call back: [phone] (home)

## 2020-03-26 ENCOUNTER — OFFICE VISIT (OUTPATIENT)
Dept: OTOLARYNGOLOGY | Facility: CLINIC | Age: 70
End: 2020-03-26
Payer: MEDICARE

## 2020-03-26 ENCOUNTER — TELEPHONE (OUTPATIENT)
Dept: OTOLARYNGOLOGY | Facility: CLINIC | Age: 70
End: 2020-03-26

## 2020-03-26 ENCOUNTER — HOSPITAL ENCOUNTER (OUTPATIENT)
Dept: RADIOLOGY | Facility: HOSPITAL | Age: 70
Discharge: HOME | End: 2020-03-26
Attending: OTOLARYNGOLOGY
Payer: MEDICARE

## 2020-03-26 VITALS — BODY MASS INDEX: 28.88 KG/M2 | TEMPERATURE: 98 F | HEIGHT: 63 IN | WEIGHT: 163 LBS

## 2020-03-26 DIAGNOSIS — R42 VERTIGO: ICD-10-CM

## 2020-03-26 DIAGNOSIS — R42 VERTIGO: Primary | ICD-10-CM

## 2020-03-26 DIAGNOSIS — H53.2 DIPLOPIA: ICD-10-CM

## 2020-03-26 PROCEDURE — 99999 PR PBB SHADOW E&M-EST. PATIENT-LVL II: ICD-10-PCS | Mod: PBBFAC,,, | Performed by: OTOLARYNGOLOGY

## 2020-03-26 PROCEDURE — 99204 OFFICE O/P NEW MOD 45 MIN: CPT | Mod: S$GLB,,, | Performed by: OTOLARYNGOLOGY

## 2020-03-26 PROCEDURE — 70553 MRI BRAIN W WO CONTRAST: ICD-10-PCS | Mod: 26,,, | Performed by: RADIOLOGY

## 2020-03-26 PROCEDURE — 99204 PR OFFICE/OUTPT VISIT, NEW, LEVL IV, 45-59 MIN: ICD-10-PCS | Mod: S$GLB,,, | Performed by: OTOLARYNGOLOGY

## 2020-03-26 PROCEDURE — A9585 GADOBUTROL INJECTION: HCPCS | Performed by: OTOLARYNGOLOGY

## 2020-03-26 PROCEDURE — 70553 MRI BRAIN STEM W/O & W/DYE: CPT | Mod: 26,,, | Performed by: RADIOLOGY

## 2020-03-26 PROCEDURE — 70553 MRI BRAIN STEM W/O & W/DYE: CPT | Mod: TC

## 2020-03-26 PROCEDURE — 1159F PR MEDICATION LIST DOCUMENTED IN MEDICAL RECORD: ICD-10-PCS | Mod: S$GLB,,, | Performed by: OTOLARYNGOLOGY

## 2020-03-26 PROCEDURE — 1159F MED LIST DOCD IN RCRD: CPT | Mod: S$GLB,,, | Performed by: OTOLARYNGOLOGY

## 2020-03-26 PROCEDURE — 25500020 PHARM REV CODE 255: Performed by: OTOLARYNGOLOGY

## 2020-03-26 PROCEDURE — 99999 PR PBB SHADOW E&M-EST. PATIENT-LVL II: CPT | Mod: PBBFAC,,, | Performed by: OTOLARYNGOLOGY

## 2020-03-26 RX ORDER — MECLIZINE HYDROCHLORIDE 25 MG/1
25 TABLET ORAL 3 TIMES DAILY PRN
Qty: 30 TABLET | Refills: 2 | Status: SHIPPED | OUTPATIENT
Start: 2020-03-26

## 2020-03-26 RX ORDER — GADOBUTROL 604.72 MG/ML
7.5 INJECTION INTRAVENOUS
Status: COMPLETED | OUTPATIENT
Start: 2020-03-26 | End: 2020-03-26

## 2020-03-26 RX ADMIN — GADOBUTROL 7.5 ML: 604.72 INJECTION INTRAVENOUS at 01:03

## 2020-03-26 NOTE — TELEPHONE ENCOUNTER
----- Message from Michael Gomez sent at 3/26/2020  9:02 AM CDT -----  Contact: daughter- Flower- [phone]  .Type:  Patient Returning Call    Who Called:Daughter   Who Left Message for Patient:Unsure   Does the patient know what this is regarding?:APPT   Would the patient rather a call back or a response via [facility]? Call back   Best Call Back Number:.[phone]  Additional Information:   Best time to call back is around 11:30    Thank You,   Michael Gomez

## 2020-03-26 NOTE — TELEPHONE ENCOUNTER
Looks like she already had virtual visit w/[facility] today for dizziness.  Please confirm w/pt that she no longer needs the appt. Thanks.

## 2020-03-26 NOTE — TELEPHONE ENCOUNTER
Gave pt and daughter her results she verbalized understanding and will follow the given instructions below.        ----- Message from Jason Daly MD sent at 3/26/2020  2:07 PM CDT -----  Please let her know that her MRI appears normal but if she continues to worsen, I would still recommend going to the ER.  Otherwise, I would recommend that she take the meclizine.

## 2020-03-26 NOTE — TELEPHONE ENCOUNTER
Spoke with pts daughter and informed that we will call with results.        ----- Message from Clarisse Gomez sent at 3/26/2020 12:19 PM CDT -----  Pt is requesting a call from nurse to discuss weather or not she will need to come jhoana to the clinic.        Please call Sharita 10 pt daughter  [phone]

## 2020-03-26 NOTE — PROGRESS NOTES
Referring Provider:    No referring provider defined for this encounter.  Subjective:   Patient: Kia Smith [id number], :1950   Visit date:3/26/2020 11:19 AM    Chief Complaint:  Other (vertigo)    HPI:  Kia is a 69 y.o. female who I was asked to see in consultation for evaluation of the following issue(s):    5-7 days of severe, intractable vertigo.  Associated severe bilateral LE weakness and diplopia.  C/o pressure in the head.  Associated excessive urination. Hx of chronic hearing loss but no acute hearing changes.  No hx of CVA.  Severe nausea.  No vomiting.      Review of Systems:  Negative unless checked off.  Gen:  []fever   []fatigue  HENT:  []nosebleeds  []dental problem   Eyes:  []photophobia  []visual disturbance  Resp:  []chest tightness []wheezing  Card:  []chest pain  []leg swelling  GI:  []abdominal pain []blood in stool  :  []dysuria  []hematuria  Musc:  []joint swelling  []gait problem  Skin:  []color change  []pallor  Neuro:  []seizures  []numbness  Hem:  []bruise/bleed easily  Psych:  []hallucinations  []behavioral problems  Allergy/Imm: is allergic to demerol [meperidine] and pcn [penicillins].    Her meds, allergies, medical, surgical, social & family histories were reviewed & updated:  -     She has a current medication list which includes the following prescription(s): albuterol, hydrocodone-acetaminophen, and meclizine.  -     She  has no past medical history on file.   -     She does not have any pertinent problems on file.   -     She  has a past surgical history that includes Hysterectomy; Appendectomy; and Cholecystectomy.  -     She  reports that she has been smoking cigarettes. She started smoking about 52 years ago. She has been smoking about 1.00 pack per day. She has never used smokeless tobacco. She reports that she does not drink alcohol or use drugs.  -     Her family history includes Cancer in her father; Heart attack in her mother; Stroke in her  "mother.  -     She is allergic to demerol [meperidine] and pcn [penicillins].    Objective:     Physical Exam:  Vitals:  Temp 97.9 °F (36.6 °C) (Tympanic)   Ht 5' 3" (1.6 m)   Wt 73.9 kg (163 lb)   BMI 28.87 kg/m²   General appearance:  Well developed, well nourished    Eyes:  Extraocular motions intact, PERRL; gazed evoked nystagmus that did not suppress.     Communication:  no hoarseness, no dysphonia    Ears:  Otoscopy of external auditory canals and tympanic membranes was normal, clinical speech reception thresholds grossly intact, no mass/lesion of auricle.  Nose:  No masses/lesions of external nose, nasal mucosa, septum, and turbinates were within normal limits.  Mouth:  No mass/lesion of lips, teeth, gums, hard/soft palate, tongue, tonsils, or oropharynx.    Cardiovascular:  No pedal edema; Radial Pulses +2     Neck & Lymphatics:  No cervical lymphadenopathy, no neck mass/crepitus/ asymmetry, trachea is midline, no thyroid enlargement/tenderness/mass.    Psych: Oriented x3,  Anxious    Respiration/Chest:  Symmetric expansion during respiration, normal respiratory effort.    Skin:  Warm and intact. No ulcerations of face, scalp, neck.    .Kostas Hallpike- nonfatigable vertigo and nystagmus bilaterally  Assessment & Plan:   Kia was seen today for other.    Diagnoses and all orders for this visit:    Vertigo  -     Basic metabolic panel; Future  -     MRI Brain W WO Contrast; Future  -     meclizine (ANTIVERT) 25 mg tablet; Take 1 tablet (25 mg total) by mouth 3 (three) times daily as needed.    Diplopia  -     Basic metabolic panel; Future  -     MRI Brain W WO Contrast; Future  -     meclizine (ANTIVERT) 25 mg tablet; Take 1 tablet (25 mg total) by mouth 3 (three) times daily as needed.          Strongly recommended that she go to the ER for workup.  We are unable to exclude all acute pathology but she refused.  BMP and MRI brain stat.  Discussed that even if Brain MRI is normal, this does not exclude " other acute conditions (spinal pathology, metabolic disease, etc).  Discussed with her daughter.         Thank you for allowing me to participate in the care of Kia.       Jason Daly MD, FACS [facility]   [facility]  [address] [place] [address].  [place]IE [place] [id number]  P: [phone]  F: [phone]

## 2020-06-01 ENCOUNTER — OFFICE VISIT (OUTPATIENT)
Dept: OPHTHALMOLOGY | Facility: CLINIC | Age: 70
End: 2020-06-01
Payer: MEDICARE

## 2020-06-01 DIAGNOSIS — H04.129 DRY EYE: ICD-10-CM

## 2020-06-01 DIAGNOSIS — H25.13 NUCLEAR SCLEROSIS OF BOTH EYES: Primary | ICD-10-CM

## 2020-06-01 DIAGNOSIS — H35.371 EPIRETINAL MEMBRANE (ERM) OF RIGHT EYE: ICD-10-CM

## 2020-06-01 DIAGNOSIS — H35.371 EPIRETINAL MEMBRANE, RIGHT: Primary | ICD-10-CM

## 2020-06-01 PROCEDURE — 92014 PR EYE EXAM, EST PATIENT,COMPREHESV: ICD-10-PCS | Mod: S$GLB,,, | Performed by: OPHTHALMOLOGY

## 2020-06-01 PROCEDURE — 92014 COMPRE OPH EXAM EST PT 1/>: CPT | Mod: S$GLB,,, | Performed by: OPHTHALMOLOGY

## 2020-06-01 PROCEDURE — 99499 UNLISTED E&M SERVICE: CPT | Mod: S$GLB,,, | Performed by: OPHTHALMOLOGY

## 2020-06-01 PROCEDURE — 99999 PR PBB SHADOW E&M-EST. PATIENT-LVL II: ICD-10-PCS | Mod: PBBFAC,,, | Performed by: OPHTHALMOLOGY

## 2020-06-01 PROCEDURE — 99999 PR PBB SHADOW E&M-EST. PATIENT-LVL II: CPT | Mod: PBBFAC,,, | Performed by: OPHTHALMOLOGY

## 2020-06-01 PROCEDURE — 99499 NO LOS: ICD-10-PCS | Mod: S$GLB,,, | Performed by: OPHTHALMOLOGY

## 2020-06-01 NOTE — PROGRESS NOTES
SUBJECTIVE  Kia Smith is 70 y.o. female  Uncorrected distance visual acuity was CF at 3' in the right eye and 20/30 in the left eye.   Chief Complaint   Patient presents with    Follow-up     8 month ERM follow up          HPI     Follow-up      Additional comments: 8 month ERM follow up              Comments     PT C/O:  Over the last several months patient is seeing double in OD(   images on top of one another).  Patient states if she covers OD images   appear single and denies any loss of VA, trauma or spots missing in VA.        1. Cataracts OU  2. ERM OD  3. Dry Eyes OU    Artificial Tears TID OU          Last edited by Kami Yo, Patient Care Assistant on 6/1/2020  1:33   PM. (History)         Assessment /Plan :  1. Nuclear sclerosis of both eyes monitor for now   2. Epiretinal membrane (ERM) of right eye consult Dr. Tillman today   3. Dry eye  -- Condition stable, no therapeutic change required. Monitoring routinely.

## 2020-06-01 NOTE — PROGRESS NOTES
===============================  Kia Smith,  6/1/2020 today   70 y.o. female   Last visit [facility]: :Visit date not found   Last [facility] dept. [facility]1/2020  [facility]:  Corrected distance visual acuity was CF at 3' in the right eye and 20/30 in the left eye.  Tonometry     Tonometry (Applanation, 2:56 PM)       Right Left    Pressure 16 16               Not recorded         Not recorded         Not recorded        Chief Complaint   Patient presents with    epiretinal membrane eval     Saw dr soriano today for diplopia.  Sent for erm eval       ________________  6/1/2020 today  HPI     epiretinal membrane eval      Additional comments: Saw dr soriano today for diplopia.  Sent for erm   eval              Comments     1. Cataracts OU  2. ERM OD  3. Dry Eyes OU    Artificial Tears TID OU            Last edited by JANICE Tillman MD on 6/1/2020  2:56 PM. (History)      Problem List Items Addressed This Visit     None      Visit Diagnoses     Epiretinal membrane, right    -  Primary        Today eval for erm   .old gliotic prefoveal erm od  Oct unavailable so unable to do consult visit today  rtc when oct working.  My office will call her.      ===========================

## 2020-06-02 ENCOUNTER — TELEPHONE (OUTPATIENT)
Dept: OPHTHALMOLOGY | Facility: CLINIC | Age: 70
End: 2020-06-02

## 2020-06-02 NOTE — TELEPHONE ENCOUNTER
----- Message from GLENIS Shaikh sent at 6/2/2020  1:05 PM CDT -----  States her eyes are hurting since yesterdays exam and is requesting a call back for advice.

## 2020-06-08 ENCOUNTER — OFFICE VISIT (OUTPATIENT)
Dept: OPHTHALMOLOGY | Facility: CLINIC | Age: 70
End: 2020-06-08
Payer: MEDICARE

## 2020-06-08 ENCOUNTER — TELEPHONE (OUTPATIENT)
Dept: OPHTHALMOLOGY | Facility: CLINIC | Age: 70
End: 2020-06-08

## 2020-06-08 DIAGNOSIS — H35.011 CHANGES IN VASCULAR APPEARANCE OF RETINA OF RIGHT EYE: ICD-10-CM

## 2020-06-08 DIAGNOSIS — H35.371 EPIRETINAL MEMBRANE, RIGHT: Primary | ICD-10-CM

## 2020-06-08 PROCEDURE — 99999 PR PBB SHADOW E&M-EST. PATIENT-LVL II: CPT | Mod: PBBFAC,,, | Performed by: OPHTHALMOLOGY

## 2020-06-08 PROCEDURE — 92014 COMPRE OPH EXAM EST PT 1/>: CPT | Mod: S$GLB,,, | Performed by: OPHTHALMOLOGY

## 2020-06-08 PROCEDURE — 92235 FLUORESCEIN ANGIOGRAPHY - OU - BOTH EYES: ICD-10-PCS | Mod: S$GLB,,, | Performed by: OPHTHALMOLOGY

## 2020-06-08 PROCEDURE — 99999 PR PBB SHADOW E&M-EST. PATIENT-LVL II: ICD-10-PCS | Mod: PBBFAC,,, | Performed by: OPHTHALMOLOGY

## 2020-06-08 PROCEDURE — 92235 FLUORESCEIN ANGRPH MLTIFRAME: CPT | Mod: S$GLB,,, | Performed by: OPHTHALMOLOGY

## 2020-06-08 PROCEDURE — 92014 PR EYE EXAM, EST PATIENT,COMPREHESV: ICD-10-PCS | Mod: S$GLB,,, | Performed by: OPHTHALMOLOGY

## 2020-06-08 NOTE — PATIENT INSTRUCTIONS
Tyshawn Ortiz MD [facility] [phone]      Oskar Holguin [phone]  Jason Bradford [phone]        Epiretinal membrane    What is an epiretinal membrane?    An epiretinal membrane is the name for wrinkling of the retina. The retina is the thin layer of nerves and visual cells that lines the inside back wall of the eye.  An epiretinal membrane develops when certain protein cells settle on the retina and cling together, forming a clear film layer on the retinal surface. Later, this clear membrane starts to contract and shrink.     Normal retinas have a smooth surface, but in people with an epiretinal membrane, the surface is irregular and bumpy. This causes distortion and bending of objects in their vision. They might say it looks like they are looking through wrinkled clear cellophane.    What causes it?     It is not known what causes an epiretinal membrane. They are more common in people with a previous eye surgery, severe eye trauma or medical problems like diabetes and cardiovascular disease. However, for the vast majority of people, there is no underlying reason. Epiretinal membranes are not caused by a lack of exercise, diet, medications, reading or sunlight. Typically an epiretinal membrane just happens.    What are the treatment options for epiretinal membrane?     If you are diagnosed with an epiretinal membrane, it is likely it will never get worse from the time of initial diagnosis. There is nothing you can do to make an epiretinal membrane better or worse.     Surgery to remove an epiretinal membrane can be an option for people that have poor vision from an ERM. The decision to perform surgery is based solely on the patients symptoms. If the patient has only mild symptoms, there is no need for surgery. About 75% of patients that undergo surgery improve their vision by an average of three lines on the eye chart.

## 2020-06-08 NOTE — PROGRESS NOTES
===============================  Kia Smith,  6/8/2020 today   70 y.o. female   Last visit [facility]: :6/1/2020   [facility]:  Uncorrected distance visual acuity was CF at 3' in the right eye and 20/30 in the left eye.  Tonometry     Tonometry (Applanation, 2:22 PM)       Right Left    Pressure 18 17               Not recorded         Not recorded         Not recorded        Chief Complaint   Patient presents with    ERM     ERM CONSULT       ________________  6/8/2020 today  HPI     ERM      Additional comments: ERM CONSULT              Comments     1. Cataracts OU  2. ERM OD  3. Dry Eyes OU    Artificial Tears TID OU          Last edited by Irene Denney on 6/8/2020  2:11 PM. (History)      Problem List Items Addressed This Visit     None      Visit Diagnoses     Epiretinal membrane, right    -  Primary    Relevant Orders    Flourescein Angiography - OU - Both Eyes (Completed)    Ambulatory referral/consult to Ophthalmology    Changes in vascular appearance of retina of right eye        Relevant Orders    Flourescein Angiography - OU - Both Eyes (Completed)          .asymmetry to veinous outflow, relatively dilated inferiorly   Worse od erm since September   Worse vision OD since October per patient  20/40 to FC  ? RVO  IVFA today  IVFA normal, no rvo  Discussed PPV OD ERM peel  Recommend consult Dr. Ortiz  Message sent to his staff        ===========================

## 2020-06-08 NOTE — TELEPHONE ENCOUNTER
----- Message from JANICE Tillman MD sent at 6/8/2020  4:01 PM CDT -----  Please contact patient for consult.

## 2020-06-22 ENCOUNTER — OFFICE VISIT (OUTPATIENT)
Dept: OPHTHALMOLOGY | Facility: CLINIC | Age: 70
End: 2020-06-22
Payer: MEDICARE

## 2020-06-22 ENCOUNTER — TELEPHONE (OUTPATIENT)
Dept: OPHTHALMOLOGY | Facility: CLINIC | Age: 70
End: 2020-06-22

## 2020-06-22 VITALS — SYSTOLIC BLOOD PRESSURE: 130 MMHG | HEART RATE: 85 BPM | DIASTOLIC BLOOD PRESSURE: 73 MMHG

## 2020-06-22 DIAGNOSIS — H35.371 EPIRETINAL MEMBRANE (ERM) OF RIGHT EYE: Primary | ICD-10-CM

## 2020-06-22 DIAGNOSIS — H35.371 RIGHT EPIRETINAL MEMBRANE: Primary | ICD-10-CM

## 2020-06-22 DIAGNOSIS — H25.13 NUCLEAR SCLEROSIS OF BOTH EYES: ICD-10-CM

## 2020-06-22 PROCEDURE — 92202 PR OPHTHALMOSCOPY, EXT, W/DRAW OPTIC NERVE/MACULA, I&R, UNI/BI: ICD-10-PCS | Mod: S$GLB,,, | Performed by: OPHTHALMOLOGY

## 2020-06-22 PROCEDURE — 92134 CPTRZ OPH DX IMG PST SGM RTA: CPT | Mod: S$GLB,,, | Performed by: OPHTHALMOLOGY

## 2020-06-22 PROCEDURE — 99999 PR PBB SHADOW E&M-EST. PATIENT-LVL III: ICD-10-PCS | Mod: PBBFAC,,, | Performed by: OPHTHALMOLOGY

## 2020-06-22 PROCEDURE — 92134 POSTERIOR SEGMENT OCT RETINA (OCULAR COHERENCE TOMOGRAPHY)-BOTH EYES: ICD-10-PCS | Mod: S$GLB,,, | Performed by: OPHTHALMOLOGY

## 2020-06-22 PROCEDURE — 92014 PR EYE EXAM, EST PATIENT,COMPREHESV: ICD-10-PCS | Mod: S$GLB,,, | Performed by: OPHTHALMOLOGY

## 2020-06-22 PROCEDURE — 92202 OPSCPY EXTND ON/MAC DRAW: CPT | Mod: S$GLB,,, | Performed by: OPHTHALMOLOGY

## 2020-06-22 PROCEDURE — 99999 PR PBB SHADOW E&M-EST. PATIENT-LVL III: CPT | Mod: PBBFAC,,, | Performed by: OPHTHALMOLOGY

## 2020-06-22 PROCEDURE — 92014 COMPRE OPH EXAM EST PT 1/>: CPT | Mod: S$GLB,,, | Performed by: OPHTHALMOLOGY

## 2020-06-22 NOTE — PROGRESS NOTES
HPI     Eye Problem      Additional comments: ERM OD              Comments     DLS: 06/08/2020 Dr. Ortiz    Patient states about 6 months ago she began seeing intermittent diplopia   in the right eye. (+)floaters (-)flashes     Eye meds:  Similason AT's PRN OU      OCT - OD - significant ERM  OS - No ME      A/P    1. ERM OD    25g PPV/ILM peel/AFx OD    Local MAC  LOC 40 min    Risks, benefits, and alternatives to treatment discussed in detail with the patient.  The patient voiced understanding and wished to proceed with the procedure    2. NS OU      TO OR

## 2020-06-22 NOTE — LETTER
June 22, 2020      JANICE Tillman MD  [address]  [place] [address]           [facility]  [address]  [place] [address]  Phone: [phone]  Fax: [phone]          Patient: Kia Smith   MR Number: [id number]   YOB: 1950   Date of Visit: 6/22/2020       Dear Dr. JANICE Tillman:    Thank you for referring Kia Smith to me for evaluation. Attached you will find relevant portions of my assessment and plan of care.    If you have questions, please do not hesitate to call me. I look forward to following Kia Smith along with you.    Sincerely,    ZULLY Ortiz MD    Enclosure  CC:  No Recipients    If you would like to receive this communication electronically, please contact [email or web address] or [phone] to request more information on [employer] Link access.    For providers and/or their staff who would like to refer a patient to [facility], please contact us through our one-stop-shop provider referral line, [facility], at [phone].    If you feel you have received this communication in error or would no longer like to receive these types of communications, please e-mail [email or web address]

## 2020-07-01 DIAGNOSIS — Z01.812 PRE-PROCEDURE LAB EXAM: Primary | ICD-10-CM

## 2020-07-05 ENCOUNTER — LAB VISIT (OUTPATIENT)
Dept: OTOLARYNGOLOGY | Facility: CLINIC | Age: 70
End: 2020-07-05
Payer: MEDICARE

## 2020-07-05 DIAGNOSIS — Z01.812 PRE-PROCEDURE LAB EXAM: ICD-10-CM

## 2020-07-05 PROCEDURE — U0003 INFECTIOUS AGENT DETECTION BY NUCLEIC ACID (DNA OR RNA); SEVERE ACUTE RESPIRATORY SYNDROME CORONAVIRUS 2 (SARS-COV-2) (CORONAVIRUS DISEASE [COVID-19]), AMPLIFIED PROBE TECHNIQUE, MAKING USE OF HIGH THROUGHPUT TECHNOLOGIES AS DESCRIBED BY CMS-2020-01-R: HCPCS

## 2020-07-06 LAB — SARS-COV-2 RNA RESP QL NAA+PROBE: NOT DETECTED

## 2020-07-07 ENCOUNTER — ANESTHESIA EVENT (OUTPATIENT)
Dept: SURGERY | Facility: HOSPITAL | Age: 70
End: 2020-07-07
Payer: MEDICARE

## 2020-07-07 ENCOUNTER — TELEPHONE (OUTPATIENT)
Dept: OPHTHALMOLOGY | Facility: CLINIC | Age: 70
End: 2020-07-07

## 2020-07-07 NOTE — H&P
Pre-Operative History & Physical  Ophthalmology      SUBJECTIVE:     History of Present Illness:  Patient is a 70 y.o. female presents with Right epiretinal membrane [H35.371].    MEDICATIONS:   No medications prior to admission.       ALLERGIES:   Review of patient's allergies indicates:   Allergen Reactions    Demerol [meperidine]     Pcn [penicillins]        PAST MEDICAL HISTORY: No past medical history on file.  PAST SURGICAL HISTORY:   Past Surgical History:   Procedure Laterality Date    APPENDECTOMY      CHOLECYSTECTOMY      HYSTERECTOMY       PAST FAMILY HISTORY:   Family History   Problem Relation Age of Onset    Stroke Mother     Heart attack Mother     Cancer Father      SOCIAL HISTORY:   Social History     Tobacco Use    Smoking status: Current Every Day Smoker     Packs/day: 1.00     Types: Cigarettes     Start date: 1968    Smokeless tobacco: Never Used   Substance Use Topics    Alcohol use: No    Drug use: No        MENTAL STATUS: Alert    REVIEW OF SYSTEMS: Negative,except per HPI    OBJECTIVE:     COVID-19 Screening Test:  Test order  Results pending  ASSESSMENT/PLAN:     Patient is a 70 y.o. female with Right epiretinal membrane [H35.371].     - Plan for surgical correction 25g PPV/ILM peel/AFx OD     Local MAC  LOC 40 min     - Risks/benefits/alternatives of the procedure including, but not limited to scarring, bleeding, infection, loss or decreased vision, and/or need for possible repeat surgery discussed with the patient and family.   - Informed consent obtained prior to surgery and the patient/family voiced good understanding.

## 2020-07-08 ENCOUNTER — ANESTHESIA (OUTPATIENT)
Dept: SURGERY | Facility: HOSPITAL | Age: 70
End: 2020-07-08
Payer: MEDICARE

## 2020-07-08 ENCOUNTER — HOSPITAL ENCOUNTER (OUTPATIENT)
Facility: HOSPITAL | Age: 70
Discharge: HOME | End: 2020-07-08
Attending: OPHTHALMOLOGY | Admitting: OPHTHALMOLOGY
Payer: MEDICARE

## 2020-07-08 VITALS
TEMPERATURE: 97 F | WEIGHT: 150 LBS | HEART RATE: 76 BPM | OXYGEN SATURATION: 100 % | SYSTOLIC BLOOD PRESSURE: 129 MMHG | HEIGHT: 62 IN | RESPIRATION RATE: 18 BRPM | BODY MASS INDEX: 27.6 KG/M2 | DIASTOLIC BLOOD PRESSURE: 76 MMHG

## 2020-07-08 DIAGNOSIS — H35.371 EPIRETINAL MEMBRANE (ERM) OF RIGHT EYE: Primary | ICD-10-CM

## 2020-07-08 PROCEDURE — S0020 INJECTION, BUPIVICAINE HYDRO: HCPCS | Performed by: OPHTHALMOLOGY

## 2020-07-08 PROCEDURE — 36000707: Performed by: OPHTHALMOLOGY

## 2020-07-08 PROCEDURE — D9220A PRA ANESTHESIA: Mod: ANES,,, | Performed by: STUDENT IN AN ORGANIZED HEALTH CARE EDUCATION/TRAINING PROGRAM

## 2020-07-08 PROCEDURE — 27600004 OPTIME MED/SURG SUP & DEVICES INTRAOCULAR LENS: Performed by: OPHTHALMOLOGY

## 2020-07-08 PROCEDURE — D9220A PRA ANESTHESIA: Mod: CRNA,,, | Performed by: NURSE ANESTHETIST, CERTIFIED REGISTERED

## 2020-07-08 PROCEDURE — 67042 VIT FOR MACULAR HOLE: CPT | Mod: RT,,, | Performed by: OPHTHALMOLOGY

## 2020-07-08 PROCEDURE — 25000003 PHARM REV CODE 250: Performed by: STUDENT IN AN ORGANIZED HEALTH CARE EDUCATION/TRAINING PROGRAM

## 2020-07-08 PROCEDURE — 37000008 HC ANESTHESIA 1ST 15 MINUTES: Performed by: OPHTHALMOLOGY

## 2020-07-08 PROCEDURE — 36000706: Performed by: OPHTHALMOLOGY

## 2020-07-08 PROCEDURE — D9220A PRA ANESTHESIA: ICD-10-PCS | Mod: ANES,,, | Performed by: STUDENT IN AN ORGANIZED HEALTH CARE EDUCATION/TRAINING PROGRAM

## 2020-07-08 PROCEDURE — 71000044 HC DOSC ROUTINE RECOVERY FIRST HOUR: Performed by: OPHTHALMOLOGY

## 2020-07-08 PROCEDURE — 63600175 PHARM REV CODE 636 W HCPCS: Performed by: OPHTHALMOLOGY

## 2020-07-08 PROCEDURE — 63600175 PHARM REV CODE 636 W HCPCS: Performed by: NURSE ANESTHETIST, CERTIFIED REGISTERED

## 2020-07-08 PROCEDURE — 71000015 HC POSTOP RECOV 1ST HR: Performed by: OPHTHALMOLOGY

## 2020-07-08 PROCEDURE — 25000003 PHARM REV CODE 250: Performed by: OPHTHALMOLOGY

## 2020-07-08 PROCEDURE — 67042 PR VITRECTOMY PARS PLANA REMOVE INT MEMB RETINA: ICD-10-PCS | Mod: RT,,, | Performed by: OPHTHALMOLOGY

## 2020-07-08 PROCEDURE — 27201423 OPTIME MED/SURG SUP & DEVICES STERILE SUPPLY: Performed by: OPHTHALMOLOGY

## 2020-07-08 PROCEDURE — 37000009 HC ANESTHESIA EA ADD 15 MINS: Performed by: OPHTHALMOLOGY

## 2020-07-08 PROCEDURE — 25000003 PHARM REV CODE 250: Performed by: NURSE ANESTHETIST, CERTIFIED REGISTERED

## 2020-07-08 PROCEDURE — D9220A PRA ANESTHESIA: ICD-10-PCS | Mod: CRNA,,, | Performed by: NURSE ANESTHETIST, CERTIFIED REGISTERED

## 2020-07-08 RX ORDER — HYDROCODONE BITARTRATE AND ACETAMINOPHEN 5; 325 MG/1; MG/1
TABLET ORAL
Status: DISCONTINUED
Start: 2020-07-08 | End: 2020-07-08 | Stop reason: HOSPADM

## 2020-07-08 RX ORDER — EPINEPHRINE CONVENIENCE KIT 1 MG/ML(1)
KIT INTRAMUSCULAR; SUBCUTANEOUS
Status: DISCONTINUED | OUTPATIENT
Start: 2020-07-08 | End: 2020-07-08 | Stop reason: HOSPADM

## 2020-07-08 RX ORDER — LIDOCAINE HYDROCHLORIDE 20 MG/ML
INJECTION, SOLUTION EPIDURAL; INFILTRATION; INTRACAUDAL; PERINEURAL
Status: DISCONTINUED | OUTPATIENT
Start: 2020-07-08 | End: 2020-07-08 | Stop reason: HOSPADM

## 2020-07-08 RX ORDER — PHENYLEPHRINE HYDROCHLORIDE 25 MG/ML
1 SOLUTION/ DROPS OPHTHALMIC
Status: DISCONTINUED | OUTPATIENT
Start: 2020-07-08 | End: 2020-07-08 | Stop reason: HOSPADM

## 2020-07-08 RX ORDER — PROPOFOL 10 MG/ML
VIAL (ML) INTRAVENOUS CONTINUOUS PRN
Status: DISCONTINUED | OUTPATIENT
Start: 2020-07-08 | End: 2020-07-08

## 2020-07-08 RX ORDER — ONDANSETRON 4 MG/1
4 TABLET, FILM COATED ORAL EVERY 8 HOURS PRN
Qty: 12 TABLET | Refills: 0 | Status: SHIPPED | OUTPATIENT
Start: 2020-07-08

## 2020-07-08 RX ORDER — DEXAMETHASONE SODIUM PHOSPHATE 4 MG/ML
INJECTION, SOLUTION INTRA-ARTICULAR; INTRALESIONAL; INTRAMUSCULAR; INTRAVENOUS; SOFT TISSUE
Status: DISCONTINUED | OUTPATIENT
Start: 2020-07-08 | End: 2020-07-08 | Stop reason: HOSPADM

## 2020-07-08 RX ORDER — FENTANYL CITRATE 50 UG/ML
INJECTION, SOLUTION INTRAMUSCULAR; INTRAVENOUS
Status: DISCONTINUED | OUTPATIENT
Start: 2020-07-08 | End: 2020-07-08

## 2020-07-08 RX ORDER — HYDROCODONE BITARTRATE AND ACETAMINOPHEN 5; 325 MG/1; MG/1
1 TABLET ORAL EVERY 4 HOURS PRN
Status: DISCONTINUED | OUTPATIENT
Start: 2020-07-08 | End: 2020-07-08 | Stop reason: HOSPADM

## 2020-07-08 RX ORDER — MOXIFLOXACIN 5 MG/ML
1 SOLUTION/ DROPS OPHTHALMIC
Status: DISCONTINUED | OUTPATIENT
Start: 2020-07-08 | End: 2020-07-08 | Stop reason: HOSPADM

## 2020-07-08 RX ORDER — DEXAMETHASONE SODIUM PHOSPHATE 4 MG/ML
INJECTION, SOLUTION INTRA-ARTICULAR; INTRALESIONAL; INTRAMUSCULAR; INTRAVENOUS; SOFT TISSUE
Status: DISCONTINUED | OUTPATIENT
Start: 2020-07-08 | End: 2020-07-08

## 2020-07-08 RX ORDER — TROPICAMIDE 10 MG/ML
1 SOLUTION/ DROPS OPHTHALMIC
Status: DISCONTINUED | OUTPATIENT
Start: 2020-07-08 | End: 2020-07-08 | Stop reason: HOSPADM

## 2020-07-08 RX ORDER — OXYCODONE AND ACETAMINOPHEN 5; 325 MG/1; MG/1
1 TABLET ORAL EVERY 6 HOURS PRN
Qty: 12 TABLET | Refills: 0 | Status: SHIPPED | OUTPATIENT
Start: 2020-07-08 | End: 2022-03-08

## 2020-07-08 RX ORDER — MIDAZOLAM HYDROCHLORIDE 1 MG/ML
INJECTION, SOLUTION INTRAMUSCULAR; INTRAVENOUS
Status: DISCONTINUED | OUTPATIENT
Start: 2020-07-08 | End: 2020-07-08

## 2020-07-08 RX ORDER — TETRACAINE HYDROCHLORIDE 5 MG/ML
1 SOLUTION OPHTHALMIC
Status: DISCONTINUED | OUTPATIENT
Start: 2020-07-08 | End: 2020-07-08 | Stop reason: HOSPADM

## 2020-07-08 RX ORDER — GLYCOPYRROLATE 0.2 MG/ML
INJECTION INTRAMUSCULAR; INTRAVENOUS
Status: DISCONTINUED | OUTPATIENT
Start: 2020-07-08 | End: 2020-07-08

## 2020-07-08 RX ORDER — ONDANSETRON 2 MG/ML
INJECTION INTRAMUSCULAR; INTRAVENOUS
Status: DISCONTINUED | OUTPATIENT
Start: 2020-07-08 | End: 2020-07-08

## 2020-07-08 RX ORDER — PREDNISOLONE ACETATE 10 MG/ML
1 SUSPENSION/ DROPS OPHTHALMIC
Status: DISCONTINUED | OUTPATIENT
Start: 2020-07-08 | End: 2020-07-08 | Stop reason: HOSPADM

## 2020-07-08 RX ORDER — ACETAMINOPHEN 325 MG/1
650 TABLET ORAL EVERY 4 HOURS PRN
Status: DISCONTINUED | OUTPATIENT
Start: 2020-07-08 | End: 2020-07-08 | Stop reason: HOSPADM

## 2020-07-08 RX ORDER — ONDANSETRON 8 MG/1
8 TABLET, ORALLY DISINTEGRATING ORAL EVERY 8 HOURS PRN
Status: DISCONTINUED | OUTPATIENT
Start: 2020-07-08 | End: 2020-07-08 | Stop reason: HOSPADM

## 2020-07-08 RX ORDER — ATROPINE SULFATE 10 MG/ML
1 SOLUTION/ DROPS OPHTHALMIC
Status: DISCONTINUED | OUTPATIENT
Start: 2020-07-08 | End: 2020-07-08 | Stop reason: HOSPADM

## 2020-07-08 RX ORDER — MIDAZOLAM HYDROCHLORIDE 1 MG/ML
INJECTION INTRAMUSCULAR; INTRAVENOUS
Status: DISCONTINUED
Start: 2020-07-08 | End: 2020-07-08 | Stop reason: HOSPADM

## 2020-07-08 RX ORDER — LIDOCAINE HYDROCHLORIDE 20 MG/ML
INJECTION INTRAVENOUS
Status: DISCONTINUED | OUTPATIENT
Start: 2020-07-08 | End: 2020-07-08

## 2020-07-08 RX ORDER — PHENYLEPHRINE HYDROCHLORIDE 10 MG/ML
INJECTION INTRAVENOUS
Status: DISCONTINUED | OUTPATIENT
Start: 2020-07-08 | End: 2020-07-08

## 2020-07-08 RX ORDER — NEOMYCIN SULFATE, POLYMYXIN B SULFATE, AND DEXAMETHASONE 3.5; 10000; 1 MG/G; [USP'U]/G; MG/G
OINTMENT OPHTHALMIC
Status: DISCONTINUED | OUTPATIENT
Start: 2020-07-08 | End: 2020-07-08 | Stop reason: HOSPADM

## 2020-07-08 RX ORDER — SODIUM CHLORIDE 0.9 % (FLUSH) 0.9 %
10 SYRINGE (ML) INJECTION
Status: DISCONTINUED | OUTPATIENT
Start: 2020-07-08 | End: 2020-07-08 | Stop reason: HOSPADM

## 2020-07-08 RX ORDER — SODIUM CHLORIDE 9 MG/ML
INJECTION, SOLUTION INTRAVENOUS CONTINUOUS
Status: DISCONTINUED | OUTPATIENT
Start: 2020-07-08 | End: 2020-07-08 | Stop reason: HOSPADM

## 2020-07-08 RX ORDER — BUPIVACAINE HYDROCHLORIDE 7.5 MG/ML
INJECTION, SOLUTION EPIDURAL; RETROBULBAR
Status: DISCONTINUED | OUTPATIENT
Start: 2020-07-08 | End: 2020-07-08 | Stop reason: HOSPADM

## 2020-07-08 RX ORDER — VANCOMYCIN HYDROCHLORIDE 500 MG/10ML
INJECTION, POWDER, LYOPHILIZED, FOR SOLUTION INTRAVENOUS
Status: DISCONTINUED | OUTPATIENT
Start: 2020-07-08 | End: 2020-07-08 | Stop reason: HOSPADM

## 2020-07-08 RX ORDER — KETAMINE HCL IN 0.9 % NACL 50 MG/5 ML
SYRINGE (ML) INTRAVENOUS
Status: DISCONTINUED | OUTPATIENT
Start: 2020-07-08 | End: 2020-07-08

## 2020-07-08 RX ORDER — PROPOFOL 10 MG/ML
VIAL (ML) INTRAVENOUS
Status: DISCONTINUED | OUTPATIENT
Start: 2020-07-08 | End: 2020-07-08

## 2020-07-08 RX ADMIN — PHENYLEPHRINE HYDROCHLORIDE 1 DROP: 25 SOLUTION/ DROPS OPHTHALMIC at 09:07

## 2020-07-08 RX ADMIN — Medication 20 MG: at 10:07

## 2020-07-08 RX ADMIN — ATROPINE SULFATE 1 DROP: 10 SOLUTION OPHTHALMIC at 09:07

## 2020-07-08 RX ADMIN — PROPOFOL 10 MG: 10 INJECTION, EMULSION INTRAVENOUS at 09:07

## 2020-07-08 RX ADMIN — SODIUM CHLORIDE, SODIUM GLUCONATE, SODIUM ACETATE, POTASSIUM CHLORIDE, MAGNESIUM CHLORIDE, SODIUM PHOSPHATE, DIBASIC, AND POTASSIUM PHOSPHATE: .53; .5; .37; .037; .03; .012; .00082 INJECTION, SOLUTION INTRAVENOUS at 10:07

## 2020-07-08 RX ADMIN — PHENYLEPHRINE HYDROCHLORIDE 100 MCG: 10 INJECTION INTRAVENOUS at 10:07

## 2020-07-08 RX ADMIN — PREDNISOLONE ACETATE 1 DROP: 10 SUSPENSION OPHTHALMIC at 09:07

## 2020-07-08 RX ADMIN — TROPICAMIDE 1 DROP: 10 SOLUTION/ DROPS OPHTHALMIC at 09:07

## 2020-07-08 RX ADMIN — LIDOCAINE HYDROCHLORIDE 50 MG: 20 INJECTION, SOLUTION INTRAVENOUS at 09:07

## 2020-07-08 RX ADMIN — PROPOFOL 50 MG: 10 INJECTION, EMULSION INTRAVENOUS at 09:07

## 2020-07-08 RX ADMIN — PROPOFOL 150 MCG/KG/MIN: 10 INJECTION, EMULSION INTRAVENOUS at 10:07

## 2020-07-08 RX ADMIN — ONDANSETRON 4 MG: 2 INJECTION, SOLUTION INTRAMUSCULAR; INTRAVENOUS at 10:07

## 2020-07-08 RX ADMIN — PHENYLEPHRINE HYDROCHLORIDE 0.5 MCG/KG/MIN: 10 INJECTION INTRAVENOUS at 10:07

## 2020-07-08 RX ADMIN — MOXIFLOXACIN 1 DROP: 5 SOLUTION/ DROPS OPHTHALMIC at 09:07

## 2020-07-08 RX ADMIN — FENTANYL CITRATE 50 MCG: 50 INJECTION, SOLUTION INTRAMUSCULAR; INTRAVENOUS at 09:07

## 2020-07-08 RX ADMIN — GLYCOPYRROLATE 0.2 MG: 0.2 INJECTION, SOLUTION INTRAMUSCULAR; INTRAVENOUS at 10:07

## 2020-07-08 RX ADMIN — TETRACAINE HYDROCHLORIDE 1 DROP: 5 SOLUTION OPHTHALMIC at 09:07

## 2020-07-08 RX ADMIN — SODIUM CHLORIDE: 0.9 INJECTION, SOLUTION INTRAVENOUS at 09:07

## 2020-07-08 RX ADMIN — MIDAZOLAM HYDROCHLORIDE 1 MG: 1 INJECTION, SOLUTION INTRAMUSCULAR; INTRAVENOUS at 09:07

## 2020-07-08 RX ADMIN — HYDROCODONE BITARTRATE AND ACETAMINOPHEN 1 TABLET: 5; 325 TABLET ORAL at 11:07

## 2020-07-08 RX ADMIN — DEXAMETHASONE SODIUM PHOSPHATE 4 MG: 4 INJECTION, SOLUTION INTRAMUSCULAR; INTRAVENOUS at 10:07

## 2020-07-08 NOTE — INTERVAL H&P NOTE
Patient seen and examined at bedside. No updates or changes to H&P. Proceed with surgery as planned.    Prince Mendez MD  [employer] [facility] PGY-2

## 2020-07-08 NOTE — TRANSFER OF CARE
"Anesthesia Transfer of Care Note    Patient: Kia Smith    Procedure(s) Performed: Procedure(s) (LRB):  VITRECTOMY, PARS PLANA APPROACH (Right)    Patient location: PACU    Anesthesia Type: general    Transport from OR: Transported from OR on 2-3 L/min O2 by NC with adequate spontaneous ventilation    Post pain: adequate analgesia    Post assessment: no apparent anesthetic complications and tolerated procedure well    Post vital signs: stable    Level of consciousness: awake    Nausea/Vomiting: no nausea/vomiting    Complications: none    Transfer of care protocol was followed      Last vitals:   Visit Vitals  BP (!) 145/67 (BP Location: Left arm, Patient Position: Lying)   Pulse (P) 86   Temp (P) 36.6 °C (97.9 °F) (Temporal)   Resp (P) 18   Ht 5' 2" (1.575 m)   Wt 68 kg (150 lb)   SpO2 (P) 100%   BMI 27.44 kg/m²     "

## 2020-07-08 NOTE — ANESTHESIA PREPROCEDURE EVALUATION
07/08/2020  Kia Smith is a 70 y.o., female.    Active Problem List with Overview Notes    Diagnosis Date Noted    Nuclear sclerosis of both eyes 09/27/2019    Epiretinal membrane (ERM) of right eye 09/27/2019    Stage 1 mild COPD by GOLD classification 08/15/2017    Pulmonary nodules 08/15/2017    Calcified granuloma of lung 08/15/2017    Tobacco use disorder 07/24/2017    Chest pain 07/11/2017    Nicotine abuse 07/11/2017     No medications prior to admission.       Review of patient's allergies indicates:   Allergen Reactions    Demerol [meperidine]     Pcn [penicillins]        No past medical history on file.  Past Surgical History:   Procedure Laterality Date    APPENDECTOMY      CHOLECYSTECTOMY      HYSTERECTOMY       Tobacco Use    Smoking status: Current Every Day Smoker     Packs/day: 1.00     Types: Cigarettes     Start date: 1968    Smokeless tobacco: Never Used   Substance and Sexual Activity    Alcohol use: No    Drug use: No    Sexual activity: Not on file       Objective:     Vital Signs (Most Recent):    Vital Signs (24h Range):           There is no height or weight on file to calculate BMI.        Significant Labs:  All pertinent labs from the last 24 hours have been reviewed.        Anesthesia Evaluation          Review of Systems         Anesthesia Plan  Type of Anesthesia, risks & benefits discussed:  Anesthesia Type:  MAC  Patient's Preference:   Intra-op Monitoring Plan: standard ASA monitors  Intra-op Monitoring Plan Comments:   Post Op Pain Control Plan: IV/PO Opioids PRN, per primary service following discharge from PACU and multimodal analgesia  Post Op Pain Control Plan Comments:   Induction:   IV  Beta Blocker:  Patient is not currently on a Beta-Blocker (No further documentation required).       Informed Consent: Patient understands risks and  agrees with Anesthesia plan.  Questions answered. Anesthesia consent signed with patient.  ASA Score: 2     Day of Surgery Review of History & Physical:    H&P update referred to the surgeon.         Ready For Surgery From Anesthesia Perspective.

## 2020-07-08 NOTE — ANESTHESIA PROCEDURE NOTES
Intubation  Performed by: Navid Marroquin CRNA  Authorized by: Jesus Brody MD     Intubation:     Induction:  Intravenous    Intubated:  Postinduction    Mask Ventilation:  N/a    Attempts:  1    Attempted By:  Student (YANCY JENKINS)    Difficult Airway Encountered?: No      Complications:  None    Airway Device:  Supraglottic airway/LMA    Airway Device Size:  3.0    Style/Cuff Inflation:  Cuffed (inflated to minimal occlusive pressure)    Inflation Amount (mL):  5    Secured at:  The lips    Placement Verified By:  Capnometry    Complicating Factors:  None

## 2020-07-08 NOTE — ANESTHESIA POSTPROCEDURE EVALUATION
Anesthesia Post Evaluation    Patient: Kia Smith    Procedure(s) Performed: Procedure(s) (LRB):  VITRECTOMY, PARS PLANA APPROACH (Right)    Final Anesthesia Type: general    Patient location during evaluation: PACU  Patient participation: Yes- Able to Participate  Level of consciousness: awake and alert  Post-procedure vital signs: reviewed and stable  Pain management: adequate  Airway patency: patent    PONV status at discharge: No PONV  Anesthetic complications: no      Cardiovascular status: stable  Respiratory status: unassisted and room air  Hydration status: euvolemic  Follow-up not needed.          Vitals Value Taken Time   /76 07/08/20 1145   Temp 36.3 °C (97.3 °F) 07/08/20 1108   Pulse 76 07/08/20 1145   Resp 18 07/08/20 1145   SpO2 100 % 07/08/20 1145         No case tracking events are documented in the log.      Pain/Efraín Score: Pain Rating Prior to Med Admin: 4 (7/8/2020 11:25 AM)  Pain Rating Post Med Admin: 0 (7/8/2020 11:45 AM)  Efraín Score: 10 (7/8/2020 11:45 AM)

## 2020-07-08 NOTE — OP NOTE
PREOPERATIVE DIAGNOSIS:  Epiretinal membrane to the right eye.     POSTOPERATIVE DIAGNOSIS:  Epiretinal membrane to the right eye with vitreoretinal tag.     PROCEDURE PERFORMED:  A 25-gauge pars plana vitrectomy with internal limiting   membrane peel, endolaser and air-fluid exchange to the right eye.  EL #104, P 150mW, D 0.1s     ATTENDING SURGEON:  ZULLY Ortiz M.D.     ASSISTANT SURGEON:  Alexis Mares (SUKH).     ANESTHESIA:  LMA with a retrobulbar injection of 4.0 mL   mixture of 0.75% Marcaine and 2% Xylocaine.     ESTIMATED BLOOD LOSS:  Minimal.     COMPLICATIONS:  None.     DISPOSITION:  Stable to Recovery.    DOS/DC - 7/8/20     INDICATIONS FOR SURGERY:  This is a 70-year-old who noted progressive   blurriness and distortion of vision, worsening over the last 12 months.    The patient was found to have a significant epiretinal membrane with macular   traction and decreased vision.  Decision was made to take the patient to surgery   to remove the epiretinal membrane, improve vision, and improve quality of life.    Risks, benefits, and alternatives of surgery were discussed in detail with   risks including loss of vision, loss of eye, retinal detachment, infection,   hemorrhage, cataract formation, lens dislocation, glaucoma, hypotony, ptosis,   and diplopia.  The patient voiced understanding and wished to proceed with the   procedure.     DESCRIPTION OF PROCEDURE:  After proper informed consent was obtained, the   patient was brought back to the Operating Suite at [facility] where   MAC anesthesia was induced.  Retrobulbar injection was provided as above without   complication.  The patient was prepped and draped in normal sterile fashion for   ophthalmic surgery and lid speculum was placed in the right eye.  A standard   three-port 25-gauge pars plana vitrectomy set up with the infusion cannula was   inserted 4.0 mm posterior to the limbus.  The infusion cannula was turned on only    after observed to be free and clear of all underlying retinal tissue.  The superonasal and superotemporal trochars were also placed 4.0 mm posterior to the limbus.The   vitrector and light pipe were introduced in the vitreous cavity and a core   vitrectomy was performed.  The posterior hyaloid was already elevated, but it   was  out to the level of the vitreous base.  ICG was used to stain the   epiretinal membrane and internal limiting membrane complex, which was peeled off   of the macula with contact lens visualization and ILM forceps.  Scleral   depression was performed 360 degrees to help with removal of the cortical   vitreous.  A vitreoretinal tag was found inferotemporally.  Endolaser applied.  An air-fluid exchange was   performed.  The trocars were removed from the eye, not leaking after gentle   massage, and the eye was normal pressure via palpation.  Subconjunctival   injections of vancomycin and Decadron were given to the patient.  The drapes   were removed from the patient.  Pt was washed free of Betadine prep solution.    Maxitrol ointment was placed in the right eye.  The eye was patch shielded.  LMA   anesthesia was reversed and she was brought to Recovery Room in stable   condition, tolerating the procedure well.  Dr. Ortiz was present for the   entire case.

## 2020-07-08 NOTE — BRIEF OP NOTE
Pre-Op Dx: ERM OD    Post Op Dx: same with VR tag inferotemporal    Procedure Performed: 25g PPV/ILM peel/EL/partial AFx OD  EL #104, P 150mW , D 0.1s    Attending Surgeon: Diana    Assistant Surgeon:     Anesthesia: LMA, retrobulbar injection of 4.5cc mixture 0.75%Marcaine, 2% Xylocaine    Estimated blood loss: Minimal    Complication: None    Specimen: None    Disposition: Stable to recovery    Findings/Outcome: ERM/hyaloid complex with PVR like growth over macula, no breaks in periphery    Date of Discharge: 7/8/20    Discharge Disposition: stable to recovery then home    F/U: tomorrow

## 2020-07-09 ENCOUNTER — OFFICE VISIT (OUTPATIENT)
Dept: OPHTHALMOLOGY | Facility: CLINIC | Age: 70
End: 2020-07-09
Payer: MEDICARE

## 2020-07-09 DIAGNOSIS — H35.371 EPIRETINAL MEMBRANE (ERM) OF RIGHT EYE: Primary | ICD-10-CM

## 2020-07-09 PROCEDURE — 99024 PR POST-OP FOLLOW-UP VISIT: ICD-10-PCS | Mod: S$GLB,,, | Performed by: OPHTHALMOLOGY

## 2020-07-09 PROCEDURE — 99999 PR PBB SHADOW E&M-EST. PATIENT-LVL III: ICD-10-PCS | Mod: PBBFAC,,, | Performed by: OPHTHALMOLOGY

## 2020-07-09 PROCEDURE — 99999 PR PBB SHADOW E&M-EST. PATIENT-LVL III: CPT | Mod: PBBFAC,,, | Performed by: OPHTHALMOLOGY

## 2020-07-09 PROCEDURE — 99024 POSTOP FOLLOW-UP VISIT: CPT | Mod: S$GLB,,, | Performed by: OPHTHALMOLOGY

## 2020-07-09 NOTE — PROGRESS NOTES
HPI     POD 1 25g PPV/ILM peel/EL/partial AFx OD EL #104, P 150mW , D 0.1s OD   07/08/2020    Patient presented to clinic with OD patched. Removed at 11:05am. Tolerated   well.     Last edited by Elaina Augustine on 7/9/2020 11:05 AM. (History)        ROS     Positive for: Eyes    Negative for: Constitutional, Gastrointestinal, Neurological, Skin,   Genitourinary, Musculoskeletal, HENT, Endocrine, Cardiovascular,   Respiratory, Psychiatric, Allergic/Imm, Heme/Lymph    Last edited by ZULLY Ortiz MD on 7/9/2020 11:55 AM. (History)        Prior OCT - OD - significant ERM  OS - No ME      A/P    1. ERM OD    S/p 25g PPV/ILM peel/AFx OD 7/8/20    Doing well, good IOP  Start gtts QID  Ointment/shield QHS    F/U 1 week with creed    2. NS OU

## 2020-07-14 ENCOUNTER — OFFICE VISIT (OUTPATIENT)
Dept: OPHTHALMOLOGY | Facility: CLINIC | Age: 70
End: 2020-07-14
Payer: MEDICARE

## 2020-07-14 DIAGNOSIS — Z98.890 POST-OPERATIVE STATE: Primary | ICD-10-CM

## 2020-07-14 PROCEDURE — 99024 POSTOP FOLLOW-UP VISIT: CPT | Mod: S$GLB,,, | Performed by: OPHTHALMOLOGY

## 2020-07-14 PROCEDURE — 99999 PR PBB SHADOW E&M-EST. PATIENT-LVL II: CPT | Mod: PBBFAC,,, | Performed by: OPHTHALMOLOGY

## 2020-07-14 PROCEDURE — 99999 PR PBB SHADOW E&M-EST. PATIENT-LVL II: ICD-10-PCS | Mod: PBBFAC,,, | Performed by: OPHTHALMOLOGY

## 2020-07-14 PROCEDURE — 99024 PR POST-OP FOLLOW-UP VISIT: ICD-10-PCS | Mod: S$GLB,,, | Performed by: OPHTHALMOLOGY

## 2020-07-14 NOTE — PROGRESS NOTES
"    ===============================  Kia Smith,  7/14/2020 today   70 y.o. female   Last visit [facility]: :6/8/2020   Last visit eye dept. 6/8/2020  [facility]:  Uncorrected distance visual acuity was 20/200 in the right eye and 20/40 in the left eye.   Not recorded         Not recorded         Not recorded         Not recorded        Chief Complaint   Patient presents with    Post-op Evaluation     1 week po check/ per Dr. Ortiz/ PPV/ILM       ________________  7/14/2020 today  HPI     Post-op Evaluation      Additional comments: 1 week po check/ per Dr. Ortiz/ PPV/ILM              Comments     POD 1 25g PPV/ILM peel/EL/partial AFx OD EL #104, P 150mW , D 0.1s OD   07/08/2020    Patient presented to clinic with OD patched. Removed at 11:05am. Tolerated   well.           Last edited by Irene Denney on 7/14/2020  1:03 PM. (History)      Problem List Items Addressed This Visit     None      Visit Diagnoses     Post-operative state    -  Primary        Bb bubble  Clear lesn "ill se better when cratc gone"  Maculae looks gret a   rtc  3 weesk    2+ ns   mtx o hs until out a1 qd  Pf bid   eye look great -   T 18  rtc 10- 14 days      .      ===========================    "

## 2020-07-29 ENCOUNTER — OFFICE VISIT (OUTPATIENT)
Dept: OPHTHALMOLOGY | Facility: CLINIC | Age: 70
End: 2020-07-29
Payer: MEDICARE

## 2020-07-29 ENCOUNTER — TELEPHONE (OUTPATIENT)
Dept: OPHTHALMOLOGY | Facility: CLINIC | Age: 70
End: 2020-07-29

## 2020-07-29 DIAGNOSIS — H25.13 NUCLEAR SCLEROSIS OF BOTH EYES: ICD-10-CM

## 2020-07-29 DIAGNOSIS — H11.31 SUBCONJUNCTIVAL HEMORRHAGE OF RIGHT EYE: Primary | ICD-10-CM

## 2020-07-29 DIAGNOSIS — H11.421 CHEMOSIS OF RIGHT CONJUNCTIVA: ICD-10-CM

## 2020-07-29 DIAGNOSIS — H35.371 EPIRETINAL MEMBRANE, RIGHT: ICD-10-CM

## 2020-07-29 DIAGNOSIS — H04.129 DRY EYE: ICD-10-CM

## 2020-07-29 PROCEDURE — 92012 PR EYE EXAM, EST PATIENT,INTERMED: ICD-10-PCS | Mod: S$GLB,,, | Performed by: OPHTHALMOLOGY

## 2020-07-29 PROCEDURE — 92012 INTRM OPH EXAM EST PATIENT: CPT | Mod: S$GLB,,, | Performed by: OPHTHALMOLOGY

## 2020-07-29 PROCEDURE — 99999 PR PBB SHADOW E&M-EST. PATIENT-LVL III: CPT | Mod: PBBFAC,,, | Performed by: OPHTHALMOLOGY

## 2020-07-29 PROCEDURE — 99999 PR PBB SHADOW E&M-EST. PATIENT-LVL III: ICD-10-PCS | Mod: PBBFAC,,, | Performed by: OPHTHALMOLOGY

## 2020-07-29 NOTE — TELEPHONE ENCOUNTER
Pt called stating that her right eye is sore, bloody and has something coming out of it. Pt states that she has a PO appt with  on Tuesday but is unsure if she can wait that long. I offered the pt to either come in to see either  at [facility] or  at [facility] this afternoon . Pt states she would rather do a virtual visit but I explained to her due to her just having surgery and her eye being bloody and sore she would need to come in to make sure everything is doing fine . She agrees to come in and see  at 2PM

## 2020-07-29 NOTE — PROGRESS NOTES
SUBJECTIVE  Kia Smith is 70 y.o. female  Uncorrected distance visual acuity was 20/200 in the right eye and 20/25 in the left eye.   Chief Complaint   Patient presents with    Eye Pain     Pt had surgery with Dr. Ortiz on 7/8/20 PPV/ILM OD, everything has been fine up until this morning her eye is red and looks like a blister in on it. Hurts to blink           HPI     Eye Pain      Additional comments: Pt had surgery with Dr. Ortiz on 7/8/20 PPV/ILM   OD, everything has been fine up until this morning her eye is red and   looks like a blister in on it. Hurts to blink               Comments     POD 1 25g PPV/ILM peel/EL/partial AFx OD EL #104, P 150mW , D 0.1s OD   07/08/2020    Patient presented to clinic with OD patched. Removed at 11:05am. Tolerated   well.               Last edited by Irene Denney on 7/29/2020  2:31 PM. (History)         Assessment /Plan :  1. Subconjunctival hemorrhage of right eye discussed reassurance  Recommend Zaditor BID prn  Recommend ice packs and elevating her head   2. Chemosis of right conjunctiva    3. Dry eye  -- Condition stable, no therapeutic change required. Monitoring routinely.     4. Epiretinal membrane, right    5. Nuclear sclerosis of both eyes monitor for now     RTC as scheduled with Dr. Tillman

## 2020-11-12 ENCOUNTER — TELEPHONE (OUTPATIENT)
Dept: OPHTHALMOLOGY | Facility: CLINIC | Age: 70
End: 2020-11-12

## 2020-11-12 NOTE — TELEPHONE ENCOUNTER
Called pt and let her know that accidentally instilling homatropine did not harm her eyes. They will undilate within the next few hours. Told pt to call us if they don't undilate in a day or two.

## 2020-11-12 NOTE — TELEPHONE ENCOUNTER
----- Message from Mine Tomas sent at 11/12/2020  9:57 AM CST -----  Pt would like return call; pt states she thinks she put wrong medication in eyes.  Please call back at [phone]. MD Jerry

## 2022-02-02 ENCOUNTER — HOSPITAL ENCOUNTER (EMERGENCY)
Facility: HOSPITAL | Age: 72
Discharge: HOME | End: 2022-02-02
Attending: EMERGENCY MEDICINE
Payer: MEDICARE

## 2022-02-02 ENCOUNTER — NURSE TRIAGE (OUTPATIENT)
Dept: ADMINISTRATIVE | Facility: CLINIC | Age: 72
End: 2022-02-02
Payer: MEDICARE

## 2022-02-02 VITALS
HEIGHT: 63 IN | RESPIRATION RATE: 20 BRPM | DIASTOLIC BLOOD PRESSURE: 65 MMHG | HEART RATE: 81 BPM | BODY MASS INDEX: 29.61 KG/M2 | TEMPERATURE: 99 F | OXYGEN SATURATION: 96 % | WEIGHT: 167.13 LBS | SYSTOLIC BLOOD PRESSURE: 141 MMHG

## 2022-02-02 DIAGNOSIS — R07.9 CHEST PAIN: Primary | ICD-10-CM

## 2022-02-02 LAB
ALBUMIN SERPL BCP-MCNC: 4.2 G/DL (ref 3.5–5.2)
ALP SERPL-CCNC: 87 U/L (ref 55–135)
ALT SERPL W/O P-5'-P-CCNC: 32 U/L (ref 10–44)
ANION GAP SERPL CALC-SCNC: 9 MMOL/L (ref 8–16)
AST SERPL-CCNC: 27 U/L (ref 10–40)
BASOPHILS # BLD AUTO: 0.12 K/UL (ref 0–0.2)
BASOPHILS NFR BLD: 1.2 % (ref 0–1.9)
BILIRUB SERPL-MCNC: 0.3 MG/DL (ref 0.1–1)
BNP SERPL-MCNC: 22 PG/ML (ref 0–99)
BUN SERPL-MCNC: 12 MG/DL (ref 8–23)
CALCIUM SERPL-MCNC: 9.3 MG/DL (ref 8.7–10.5)
CHLORIDE SERPL-SCNC: 105 MMOL/L (ref 95–110)
CK SERPL-CCNC: 192 U/L (ref 20–180)
CO2 SERPL-SCNC: 27 MMOL/L (ref 23–29)
CREAT SERPL-MCNC: 0.7 MG/DL (ref 0.5–1.4)
CTP QC/QA: YES
DIFFERENTIAL METHOD: ABNORMAL
EOSINOPHIL # BLD AUTO: 0.2 K/UL (ref 0–0.5)
EOSINOPHIL NFR BLD: 2.3 % (ref 0–8)
ERYTHROCYTE [DISTWIDTH] IN BLOOD BY AUTOMATED COUNT: 12.7 % (ref 11.5–14.5)
EST. GFR  (AFRICAN AMERICAN): >60 ML/MIN/1.73 M^2
EST. GFR  (NON AFRICAN AMERICAN): >60 ML/MIN/1.73 M^2
GLUCOSE SERPL-MCNC: 109 MG/DL (ref 70–110)
HCT VFR BLD AUTO: 40.8 % (ref 37–48.5)
HGB BLD-MCNC: 12.9 G/DL (ref 12–16)
IMM GRANULOCYTES # BLD AUTO: 0.04 K/UL (ref 0–0.04)
IMM GRANULOCYTES NFR BLD AUTO: 0.4 % (ref 0–0.5)
LYMPHOCYTES # BLD AUTO: 3.3 K/UL (ref 1–4.8)
LYMPHOCYTES NFR BLD: 33.5 % (ref 18–48)
MCH RBC QN AUTO: 28.4 PG (ref 27–31)
MCHC RBC AUTO-ENTMCNC: 31.6 G/DL (ref 32–36)
MCV RBC AUTO: 90 FL (ref 82–98)
MONOCYTES # BLD AUTO: 0.5 K/UL (ref 0.3–1)
MONOCYTES NFR BLD: 4.9 % (ref 4–15)
NEUTROPHILS # BLD AUTO: 5.6 K/UL (ref 1.8–7.7)
NEUTROPHILS NFR BLD: 57.7 % (ref 38–73)
NRBC BLD-RTO: 0 /100 WBC
PLATELET # BLD AUTO: 211 K/UL (ref 150–450)
PMV BLD AUTO: 10.1 FL (ref 9.2–12.9)
POTASSIUM SERPL-SCNC: 3.6 MMOL/L (ref 3.5–5.1)
PROT SERPL-MCNC: 7.1 G/DL (ref 6–8.4)
RBC # BLD AUTO: 4.54 M/UL (ref 4–5.4)
SARS-COV-2 RDRP RESP QL NAA+PROBE: NEGATIVE
SODIUM SERPL-SCNC: 141 MMOL/L (ref 136–145)
TROPONIN I SERPL DL<=0.01 NG/ML-MCNC: <0.006 NG/ML (ref 0–0.03)
WBC # BLD AUTO: 9.69 K/UL (ref 3.9–12.7)

## 2022-02-02 PROCEDURE — 93010 ELECTROCARDIOGRAM REPORT: CPT | Mod: ,,, | Performed by: INTERNAL MEDICINE

## 2022-02-02 PROCEDURE — 93005 ELECTROCARDIOGRAM TRACING: CPT

## 2022-02-02 PROCEDURE — 36000 PLACE NEEDLE IN VEIN: CPT

## 2022-02-02 PROCEDURE — 93010 EKG 12-LEAD: ICD-10-PCS | Mod: ,,, | Performed by: INTERNAL MEDICINE

## 2022-02-02 PROCEDURE — U0002 COVID-19 LAB TEST NON-CDC: HCPCS | Performed by: NURSE PRACTITIONER

## 2022-02-02 PROCEDURE — 84484 ASSAY OF TROPONIN QUANT: CPT | Performed by: NURSE PRACTITIONER

## 2022-02-02 PROCEDURE — 99285 EMERGENCY DEPT VISIT HI MDM: CPT | Mod: 25

## 2022-02-02 PROCEDURE — 83880 ASSAY OF NATRIURETIC PEPTIDE: CPT | Performed by: NURSE PRACTITIONER

## 2022-02-02 PROCEDURE — 82550 ASSAY OF CK (CPK): CPT | Performed by: NURSE PRACTITIONER

## 2022-02-02 PROCEDURE — 85025 COMPLETE CBC W/AUTO DIFF WBC: CPT | Performed by: NURSE PRACTITIONER

## 2022-02-02 PROCEDURE — 80053 COMPREHEN METABOLIC PANEL: CPT | Performed by: NURSE PRACTITIONER

## 2022-02-02 NOTE — FIRST PROVIDER EVALUATION
Medical screening exam completed.  I have conducted a focused provider triage encounter, findings are as follows:    71 year old female with complaint of chest pain and SOB X 4 days. Denies fever or chills.

## 2022-02-02 NOTE — TELEPHONE ENCOUNTER
Would like to make appointment with internal medicine, has not seen a provider in 20 years. Transferred due to complaint of SOB and heart racing.  Yesterday, she did not want to get up out of bed. Legs were weak, heart would race, felt PVC'S, feels weak. Denies chest pain now but does occur when heart racing. Explained why transferred to me, and reason for questions. Triage done- ED now, due to answering yes to SOB even at rest. Patient states she is not going, she just wants an appointment.     Explained to patient rationale for triage due to high acuity complaints. Advise I could not give appointment, patient upset. States she will call another provider and not tell the truth. No further questions at this time.     Reason for Disposition   MODERATE difficulty breathing (e.g., speaks in phrases, SOB even at rest, pulse 100-120) of new-onset or worse than normal    Additional Information   Negative: SEVERE difficulty breathing (e.g., struggling for each breath, speaks in single words, pulse > 120)   Negative: Breathing stopped and hasn't returned   Negative: Choking on something   Negative: Bluish (or gray) lips or face   Negative: Difficult to awaken or acting confused (e.g., disoriented, slurred speech)   Negative: Passed out (i.e., fainted, collapsed and was not responding)   Negative: Wheezing started suddenly after medicine, an allergic food, or bee sting   Negative: Stridor   Negative: Slow, shallow and weak breathing   Negative: Sounds like a life-threatening emergency to the triager   Negative: Wheezing (high pitched whistling sound) and previous asthma attacks or use of asthma medicines   Negative: Difficulty breathing and within 14 days of COVID-19 Exposure   Negative: Chest pain   Negative: Difficulty breathing and only present when coughing   Negative: Difficulty breathing and only from stuffy nose   Negative: Difficulty breathing and only from stuffy nose or runny nose from common  cold    Protocols used: BREATHING DIFFICULTY-A-OH

## 2022-02-02 NOTE — ED PROVIDER NOTES
"SCRIBE #1 NOTE: I, Blair Diaz, am scribing for, and in the presence of, Shell Christensen MD. I have scribed the entire note.      History      Chief Complaint   Patient presents with    Chest Pain     Pt states, "I am having pain in the left side of my chest, with weakness and shortness of breath."       Review of patient's allergies indicates:   Allergen Reactions    Demerol [meperidine]     Pcn [penicillins]         HPI   HPI    2/2/2022, 1:40 PM   History obtained from the patient and daughter      History of Present Illness: Kia Smith is a 71 y.o. female patient who presents to the Emergency Department for chest pain, onset 4 days PTA. Pt describes the pain as a tight, aching sensation. Symptoms are intermittent and moderate in severity. No mitigating or exacerbating factors reported. Associated sxs include intermittent SOB, generalized weakness, and intermittent palpitations. Patient denies any fever, chills, n/v/d, numbness, dizziness, headache, and all other sxs at this time. Pt is an active smoker, and is not currently established with Cardiology. No further complaints or concerns at this time.     Arrival mode: Personal vehicle    PCP: Primary Doctor No       Past Medical History:  No past medical history on file.    Past Surgical History:  Past Surgical History:   Procedure Laterality Date    APPENDECTOMY      CHOLECYSTECTOMY      HYSTERECTOMY      VITRECTOMY BY PARS PLANA APPROACH Right 7/8/2020    Procedure: VITRECTOMY, PARS PLANA APPROACH;  Surgeon: ZULLY Ortiz MD;  Location: [facility] OR [address] [place] [address];  Service: Ophthalmology;  Laterality: Right;  40 min         Family History:  Family History   Problem Relation Age of Onset    Stroke Mother     Heart attack Mother     Cancer Father        Social History:  Social History     Tobacco Use    Smoking status: Current Every Day Smoker     Packs/day: 1.00     Types: Cigarettes     Start date: 1968    Smokeless tobacco: Never Used "   Substance and Sexual Activity    Alcohol use: No    Drug use: No    Sexual activity: Not on file       ROS   Review of Systems   Constitutional: Negative for chills and fever.   HENT: Negative for sore throat.    Respiratory: Positive for shortness of breath (intermittent).    Cardiovascular: Positive for chest pain (intermittent) and palpitations (intermittent).   Gastrointestinal: Negative for diarrhea, nausea and vomiting.   Genitourinary: Negative for dysuria.   Musculoskeletal: Negative for back pain.   Skin: Negative for rash.   Neurological: Positive for weakness (generalized). Negative for dizziness, light-headedness, numbness and headaches.   Hematological: Does not bruise/bleed easily.   All other systems reviewed and are negative.    Physical Exam      Initial Vitals [02/02/22 1230]   BP Pulse Resp Temp SpO2   126/60 87 20 98.1 °F (36.7 °C) 98 %      MAP       --          Physical Exam  Nursing Notes and Vital Signs Reviewed.  Constitutional: Patient is in no acute distress. Well-developed and well-nourished.  Head: Atraumatic. Normocephalic.  Eyes: PERRL. EOM intact. Conjunctivae are not pale. No scleral icterus.  ENT: Mucous membranes are moist. Oropharynx is clear and symmetric.    Neck: Supple. Full ROM.   Cardiovascular: Regular rate. Regular rhythm. No murmurs, rubs, or gallops. Distal pulses are 2+ and symmetric.  Pulmonary/Chest: No respiratory distress. Clear to auscultation bilaterally. No wheezing or rales.  Abdominal: Soft and non-distended.  There is no tenderness.  No rebound, guarding, or rigidity.   Musculoskeletal: Moves all extremities. No obvious deformities. No edema.  Skin: Warm and dry.  Neurological:  Alert, awake, and appropriate.  Normal speech.  No acute focal neurological deficits are appreciated.  Psychiatric: Anxious. Good eye contact. Appropriate in content.    ED Course    Procedures  ED Vital Signs:  Vitals:    02/02/22 1230 02/02/22 1345 02/02/22 1400 02/02/22 1401  "  BP: 126/60 129/78 (!) 143/65    Pulse: 87 73 77 73   Resp: 20 20 20    Temp: 98.1 °F (36.7 °C)      TempSrc: Oral      SpO2: 98% 97% 97%    Weight: 75.8 kg (167 lb 1.7 oz)      Height: 5' 3" (1.6 m)       02/02/22 1445 02/02/22 1500 02/02/22 1530 02/02/22 1545   BP: (!) 150/70 (!) 159/76 (!) 141/65 (!) 141/65   Pulse: 74 76 74 81   Resp: 20 20 20 20   Temp:    98.7 °F (37.1 °C)   TempSrc:       SpO2: 96% 99% 96% 96%   Weight:       Height:           Abnormal Lab Results:  Labs Reviewed   CBC W/ AUTO DIFFERENTIAL - Abnormal; Notable for the following components:       Result Value    MCHC 31.6 (*)     All other components within normal limits   CK - Abnormal; Notable for the following components:     (*)     All other components within normal limits   COMPREHENSIVE METABOLIC PANEL   TROPONIN I   B-TYPE NATRIURETIC PEPTIDE   SARS-COV-2 RDRP GENE    Narrative:     This test utilizes isothermal nucleic acid amplification   technology to detect the SARS-CoV-2 RdRp nucleic acid segment.   The analytical sensitivity (limit of detection) is 125 genome   equivalents/mL.   A POSITIVE result implies infection with the SARS-CoV-2 virus;   the patient is presumed to be contagious.     A NEGATIVE result means that SARS-CoV-2 nucleic acids are not   present above the limit of detection. A NEGATIVE result should be   treated as presumptive. It does not rule out the possibility of   COVID-19 and should not be the sole basis for treatment decisions.   If COVID-19 is strongly suspected based on clinical and exposure   history, re-testing using an alternate molecular assay should be   considered.   This test is only for use under the Food and Drug   Administration s Emergency Use Authorization (EUA).   Commercial kits are provided by [employer].   Performance characteristics of the EUA have been independently   verified by [facility] Department of   Pathology and Laboratory Medicine. "   _________________________________________________________________   The authorized Fact Sheet for Healthcare Providers and the authorized Fact   Sheet for Patients of the ID NOW COVID-19 are available on the FDA   website:     https://www.fda.gov/media/757784/download  https://www.fda.gov/media/013781/download              All Lab Results:  Results for orders placed or performed during the hospital encounter of 02/02/22   CBC auto differential   Result Value Ref Range    WBC 9.69 3.90 - 12.70 K/uL    RBC 4.54 4.00 - 5.40 M/uL    Hemoglobin 12.9 12.0 - 16.0 g/dL    Hematocrit 40.8 37.0 - 48.5 %    MCV 90 82 - 98 fL    MCH 28.4 27.0 - 31.0 pg    MCHC 31.6 (L) 32.0 - 36.0 g/dL    RDW 12.7 11.5 - 14.5 %    Platelets 211 150 - 450 K/uL    MPV 10.1 9.2 - 12.9 fL    Immature Granulocytes 0.4 0.0 - 0.5 %    Gran # (ANC) 5.6 1.8 - 7.7 K/uL    Immature Grans (Abs) 0.04 0.00 - 0.04 K/uL    Lymph # 3.3 1.0 - 4.8 K/uL    Mono # 0.5 0.3 - 1.0 K/uL    Eos # 0.2 0.0 - 0.5 K/uL    Baso # 0.12 0.00 - 0.20 K/uL    nRBC 0 0 /100 WBC    Gran % 57.7 38.0 - 73.0 %    Lymph % 33.5 18.0 - 48.0 %    Mono % 4.9 4.0 - 15.0 %    Eosinophil % 2.3 0.0 - 8.0 %    Basophil % 1.2 0.0 - 1.9 %    Differential Method Automated    Comprehensive metabolic panel   Result Value Ref Range    Sodium 141 136 - 145 mmol/L    Potassium 3.6 3.5 - 5.1 mmol/L    Chloride 105 95 - 110 mmol/L    CO2 27 23 - 29 mmol/L    Glucose 109 70 - 110 mg/dL    BUN 12 8 - 23 mg/dL    Creatinine 0.7 0.5 - 1.4 mg/dL    Calcium 9.3 8.7 - 10.5 mg/dL    Total Protein 7.1 6.0 - 8.4 g/dL    Albumin 4.2 3.5 - 5.2 g/dL    Total Bilirubin 0.3 0.1 - 1.0 mg/dL    Alkaline Phosphatase 87 55 - 135 U/L    AST 27 10 - 40 U/L    ALT 32 10 - 44 U/L    Anion Gap 9 8 - 16 mmol/L    eGFR if African American >60 >60 mL/min/1.73 m^2    eGFR if non African American >60 >60 mL/min/1.73 m^2   CPK   Result Value Ref Range     (H) 20 - 180 U/L   Troponin I   Result Value Ref Range    Troponin I  <0.006 0.000 - 0.026 ng/mL   Brain natriuretic peptide   Result Value Ref Range    BNP 22 0 - 99 pg/mL   POCT COVID-19 Rapid Screening   Result Value Ref Range    POC Rapid COVID Negative Negative     Acceptable Yes      Imaging Results:  Imaging Results          X-Ray Chest 1 View (Final result)  Result time 02/02/22 14:53:24    Final result by Raj Hunt MD (02/02/22 14:53:24)                 Impression:      No acute findings.      Electronically signed by: Raj Hunt MD  Date:    02/02/2022  Time:    14:53             Narrative:    EXAMINATION:  XR CHEST 1 VIEW    CLINICAL HISTORY:  Acute chest pain,    COMPARISON:  06/27/2019 chest x-ray    FINDINGS:  Heart size is normal. The lung fields are clear. No acute cardiopulmonary infiltrate.                               The EKG was ordered, reviewed, and independently interpreted by the ED provider.  Interpretation time: 12:34  Rate: 85 BPM  Rhythm: normal sinus rhythm  Interpretation: No acute ST changes. No STEMI.           The Emergency Provider reviewed the vital signs and test results, which are outlined above.    ED Discussion   ECG, enzymes, labs unremarkable discussed further follow up outpatient with cardiology for stress test    3:25 PM: Reassessed pt at this time. Discussed with pt all pertinent ED information and results. Discussed pt dx and plan of tx. Gave pt all f/u and return to the ED instructions. All questions and concerns were addressed at this time. Pt expresses understanding of information and instructions, and is comfortable with plan to discharge. Pt is stable for discharge.    I discussed with patient and/or family/caretaker that evaluation in the ED does not suggest any emergent or life threatening medical conditions requiring immediate intervention beyond what was provided in the ED, and I believe patient is safe for discharge.  Regardless, an unremarkable evaluation in the ED does not preclude the  development or presence of a serious of life threatening condition. As such, patient was instructed to return immediately for any worsening or change in current symptoms.         ED Medication(s):  Medications - No data to display     Follow-up Information     [facility]. Schedule an appointment as soon as possible for a visit in 2 days.    Specialty: Cardiology  Why: Return to the Emergency Room, If symptoms worsen  Contact information:  [address] [facility] [address]  [phone]                      Discharge Medication List as of 2/2/2022  3:41 PM            Medical Decision Making    Medical Decision Making:   Clinical Tests:   Lab Tests: Ordered and Reviewed  Radiological Study: Ordered and Reviewed  Medical Tests: Ordered and Reviewed           Scribe Attestation:   Scribe #1: I performed the above scribed service and the documentation accurately describes the services I performed. I attest to the accuracy of the note.    Attending:   Physician Attestation Statement for Scribe #1: I, Shell Christensen MD, personally performed the services described in this documentation, as scribed by Blair Diaz, in my presence, and it is both accurate and complete.          Clinical Impression       ICD-10-CM ICD-9-CM   1. Chest pain  R07.9 786.50       Disposition:   Disposition: Discharged  Condition: Stable         Shell Christensen MD  02/02/22 0856

## 2022-02-03 ENCOUNTER — PES CALL (OUTPATIENT)
Dept: ADMINISTRATIVE | Facility: CLINIC | Age: 72
End: 2022-02-03
Payer: MEDICARE

## 2022-02-08 ENCOUNTER — OFFICE VISIT (OUTPATIENT)
Dept: INTERNAL MEDICINE | Facility: CLINIC | Age: 72
End: 2022-02-08
Payer: MEDICARE

## 2022-02-08 ENCOUNTER — OFFICE VISIT (OUTPATIENT)
Dept: CARDIOLOGY | Facility: CLINIC | Age: 72
End: 2022-02-08
Payer: MEDICARE

## 2022-02-08 VITALS
DIASTOLIC BLOOD PRESSURE: 70 MMHG | BODY MASS INDEX: 29.61 KG/M2 | SYSTOLIC BLOOD PRESSURE: 120 MMHG | HEIGHT: 63 IN | HEART RATE: 83 BPM | WEIGHT: 167.13 LBS | OXYGEN SATURATION: 99 %

## 2022-02-08 VITALS
HEART RATE: 99 BPM | BODY MASS INDEX: 29.63 KG/M2 | TEMPERATURE: 98 F | WEIGHT: 167.25 LBS | SYSTOLIC BLOOD PRESSURE: 128 MMHG | DIASTOLIC BLOOD PRESSURE: 68 MMHG | HEIGHT: 63 IN | OXYGEN SATURATION: 96 %

## 2022-02-08 DIAGNOSIS — R06.02 SHORTNESS OF BREATH: Primary | ICD-10-CM

## 2022-02-08 DIAGNOSIS — R91.8 PULMONARY NODULES: ICD-10-CM

## 2022-02-08 DIAGNOSIS — Z86.39 HISTORY OF THYROID DISEASE: ICD-10-CM

## 2022-02-08 DIAGNOSIS — J44.9 STAGE 1 MILD COPD BY GOLD CLASSIFICATION: ICD-10-CM

## 2022-02-08 DIAGNOSIS — J84.10 CALCIFIED GRANULOMA OF LUNG: Chronic | ICD-10-CM

## 2022-02-08 DIAGNOSIS — E78.5 HYPERLIPIDEMIA, UNSPECIFIED HYPERLIPIDEMIA TYPE: Primary | ICD-10-CM

## 2022-02-08 DIAGNOSIS — J44.9 CHRONIC OBSTRUCTIVE PULMONARY DISEASE, UNSPECIFIED COPD TYPE: ICD-10-CM

## 2022-02-08 DIAGNOSIS — R00.2 PALPITATION: ICD-10-CM

## 2022-02-08 DIAGNOSIS — F17.200 TOBACCO USE DISORDER: ICD-10-CM

## 2022-02-08 DIAGNOSIS — R07.9 CHEST PAIN SYNDROME: ICD-10-CM

## 2022-02-08 DIAGNOSIS — Z72.0 NICOTINE ABUSE: ICD-10-CM

## 2022-02-08 DIAGNOSIS — R07.9 CHEST PAIN, UNSPECIFIED TYPE: ICD-10-CM

## 2022-02-08 PROCEDURE — 99499 RISK ADDL DX/OHS AUDIT: ICD-10-PCS | Mod: S$GLB,,, | Performed by: INTERNAL MEDICINE

## 2022-02-08 PROCEDURE — 3008F PR BODY MASS INDEX (BMI) DOCUMENTED: ICD-10-PCS | Mod: CPTII,S$GLB,, | Performed by: INTERNAL MEDICINE

## 2022-02-08 PROCEDURE — 99204 PR OFFICE/OUTPT VISIT, NEW, LEVL IV, 45-59 MIN: ICD-10-PCS | Mod: S$GLB,,, | Performed by: INTERNAL MEDICINE

## 2022-02-08 PROCEDURE — 3078F PR MOST RECENT DIASTOLIC BLOOD PRESSURE < 80 MM HG: ICD-10-PCS | Mod: CPTII,S$GLB,, | Performed by: INTERNAL MEDICINE

## 2022-02-08 PROCEDURE — 1100F PR PT FALLS ASSESS DOC 2+ FALLS/FALL W/INJURY/YR: ICD-10-PCS | Mod: CPTII,S$GLB,, | Performed by: FAMILY MEDICINE

## 2022-02-08 PROCEDURE — 1159F PR MEDICATION LIST DOCUMENTED IN MEDICAL RECORD: ICD-10-PCS | Mod: CPTII,S$GLB,, | Performed by: INTERNAL MEDICINE

## 2022-02-08 PROCEDURE — 99204 OFFICE O/P NEW MOD 45 MIN: CPT | Mod: S$GLB,,, | Performed by: INTERNAL MEDICINE

## 2022-02-08 PROCEDURE — 3074F SYST BP LT 130 MM HG: CPT | Mod: CPTII,S$GLB,, | Performed by: INTERNAL MEDICINE

## 2022-02-08 PROCEDURE — 1126F PR PAIN SEVERITY QUANTIFIED, NO PAIN PRESENT: ICD-10-PCS | Mod: CPTII,S$GLB,, | Performed by: INTERNAL MEDICINE

## 2022-02-08 PROCEDURE — 99999 PR PBB SHADOW E&M-EST. PATIENT-LVL III: CPT | Mod: PBBFAC,,, | Performed by: INTERNAL MEDICINE

## 2022-02-08 PROCEDURE — 3288F FALL RISK ASSESSMENT DOCD: CPT | Mod: CPTII,S$GLB,, | Performed by: INTERNAL MEDICINE

## 2022-02-08 PROCEDURE — 1126F AMNT PAIN NOTED NONE PRSNT: CPT | Mod: CPTII,S$GLB,, | Performed by: INTERNAL MEDICINE

## 2022-02-08 PROCEDURE — 3288F PR FALLS RISK ASSESSMENT DOCUMENTED: ICD-10-PCS | Mod: CPTII,S$GLB,, | Performed by: INTERNAL MEDICINE

## 2022-02-08 PROCEDURE — 3288F FALL RISK ASSESSMENT DOCD: CPT | Mod: CPTII,S$GLB,, | Performed by: FAMILY MEDICINE

## 2022-02-08 PROCEDURE — 3074F SYST BP LT 130 MM HG: CPT | Mod: CPTII,S$GLB,, | Performed by: FAMILY MEDICINE

## 2022-02-08 PROCEDURE — 3074F PR MOST RECENT SYSTOLIC BLOOD PRESSURE < 130 MM HG: ICD-10-PCS | Mod: CPTII,S$GLB,, | Performed by: FAMILY MEDICINE

## 2022-02-08 PROCEDURE — 1100F PTFALLS ASSESS-DOCD GE2>/YR: CPT | Mod: CPTII,S$GLB,, | Performed by: FAMILY MEDICINE

## 2022-02-08 PROCEDURE — 3008F BODY MASS INDEX DOCD: CPT | Mod: CPTII,S$GLB,, | Performed by: INTERNAL MEDICINE

## 2022-02-08 PROCEDURE — 99999 PR PBB SHADOW E&M-EST. PATIENT-LVL IV: CPT | Mod: PBBFAC,,, | Performed by: FAMILY MEDICINE

## 2022-02-08 PROCEDURE — 3008F BODY MASS INDEX DOCD: CPT | Mod: CPTII,S$GLB,, | Performed by: FAMILY MEDICINE

## 2022-02-08 PROCEDURE — 3078F DIAST BP <80 MM HG: CPT | Mod: CPTII,S$GLB,, | Performed by: FAMILY MEDICINE

## 2022-02-08 PROCEDURE — 1101F PR PT FALLS ASSESS DOC 0-1 FALLS W/OUT INJ PAST YR: ICD-10-PCS | Mod: CPTII,S$GLB,, | Performed by: INTERNAL MEDICINE

## 2022-02-08 PROCEDURE — 3008F PR BODY MASS INDEX (BMI) DOCUMENTED: ICD-10-PCS | Mod: CPTII,S$GLB,, | Performed by: FAMILY MEDICINE

## 2022-02-08 PROCEDURE — 1159F MED LIST DOCD IN RCRD: CPT | Mod: CPTII,S$GLB,, | Performed by: INTERNAL MEDICINE

## 2022-02-08 PROCEDURE — 99999 PR PBB SHADOW E&M-EST. PATIENT-LVL IV: ICD-10-PCS | Mod: PBBFAC,,, | Performed by: FAMILY MEDICINE

## 2022-02-08 PROCEDURE — 99999 PR PBB SHADOW E&M-EST. PATIENT-LVL III: ICD-10-PCS | Mod: PBBFAC,,, | Performed by: INTERNAL MEDICINE

## 2022-02-08 PROCEDURE — 1101F PT FALLS ASSESS-DOCD LE1/YR: CPT | Mod: CPTII,S$GLB,, | Performed by: INTERNAL MEDICINE

## 2022-02-08 PROCEDURE — 3078F DIAST BP <80 MM HG: CPT | Mod: CPTII,S$GLB,, | Performed by: INTERNAL MEDICINE

## 2022-02-08 PROCEDURE — 3078F PR MOST RECENT DIASTOLIC BLOOD PRESSURE < 80 MM HG: ICD-10-PCS | Mod: CPTII,S$GLB,, | Performed by: FAMILY MEDICINE

## 2022-02-08 PROCEDURE — 99203 OFFICE O/P NEW LOW 30 MIN: CPT | Mod: S$GLB,,, | Performed by: FAMILY MEDICINE

## 2022-02-08 PROCEDURE — 99499 UNLISTED E&M SERVICE: CPT | Mod: S$GLB,,, | Performed by: INTERNAL MEDICINE

## 2022-02-08 PROCEDURE — 3288F PR FALLS RISK ASSESSMENT DOCUMENTED: ICD-10-PCS | Mod: CPTII,S$GLB,, | Performed by: FAMILY MEDICINE

## 2022-02-08 PROCEDURE — 3074F PR MOST RECENT SYSTOLIC BLOOD PRESSURE < 130 MM HG: ICD-10-PCS | Mod: CPTII,S$GLB,, | Performed by: INTERNAL MEDICINE

## 2022-02-08 PROCEDURE — 99203 PR OFFICE/OUTPT VISIT, NEW, LEVL III, 30-44 MIN: ICD-10-PCS | Mod: S$GLB,,, | Performed by: FAMILY MEDICINE

## 2022-02-08 NOTE — PROGRESS NOTES
"Subjective:       Patient ID: Kia Smith is a 71 y.o. female.    Chief Complaint: Establish Care    Patient presents to clinic today to establish care. Her daughter is present with her today. She was seen in the ER 2/2/22 for chest pain. She is scheduled to see Cardiology this afternoon. She has not been followed by PCP. Reports episodes of palpitations, chest pain and weakness x 4-5 months. Worsening lately. Episodes of feeling weak and her heart rate gets really fast. This occurs with any activity. Dull ache in chest, describes as "tight". COPD x a few years.    Review of Systems   Constitutional: Positive for fatigue. Negative for chills, fever and unexpected weight change.   HENT: Positive for congestion, ear pain, hearing loss and rhinorrhea. Negative for dental problem and trouble swallowing.    Eyes: Positive for pain and visual disturbance.   Respiratory: Positive for cough and shortness of breath.    Cardiovascular: Positive for chest pain, palpitations and leg swelling.   Gastrointestinal: Positive for nausea. Negative for abdominal distention, abdominal pain, blood in stool, constipation, diarrhea and vomiting.   Genitourinary: Negative for difficulty urinating and vaginal discharge.   Musculoskeletal: Negative for arthralgias and myalgias.   Skin: Negative for rash.   Neurological: Positive for dizziness, weakness and numbness. Negative for headaches.   Hematological: Negative for adenopathy. Bruises/bleeds easily.   Psychiatric/Behavioral: Positive for sleep disturbance. Negative for dysphoric mood. The patient is not nervous/anxious.          Objective:      Physical Exam  Vitals reviewed.   Constitutional:       General: She is not in acute distress.     Appearance: She is well-developed.   HENT:      Head: Normocephalic and atraumatic.   Eyes:      General: Lids are normal. No scleral icterus.     Extraocular Movements: Extraocular movements intact.      Conjunctiva/sclera: " Conjunctivae normal.      Pupils: Pupils are equal, round, and reactive to light.   Cardiovascular:      Rate and Rhythm: Normal rate and regular rhythm.      Heart sounds: No murmur heard.  No friction rub. No gallop.    Pulmonary:      Effort: Pulmonary effort is normal.      Breath sounds: Normal breath sounds. No decreased breath sounds, wheezing, rhonchi or rales.   Neurological:      Mental Status: She is alert and oriented to person, place, and time.      Cranial Nerves: No cranial nerve deficit.      Gait: Gait normal.   Psychiatric:         Mood and Affect: Mood and affect normal.         Assessment:       1. Hyperlipidemia, unspecified hyperlipidemia type    2. History of thyroid disease    3. Chronic obstructive pulmonary disease, unspecified COPD type        Plan:     Problem List Items Addressed This Visit     Chronic obstructive pulmonary disease    Relevant Orders    Ambulatory referral/consult to Pulmonology    History of thyroid disease    Relevant Orders    T4, Free    TSH    Hyperlipidemia - Primary    Relevant Orders    Lipid Panel        Advised most important issue at this time is to see Cardiology for evaluation of the episodes she has been having for the last few months.  Advised we can schedule follow up for annual and to address any other persistent issues.  Health Maintenance reviewed/updated. Will address other HM at future visits.

## 2022-02-08 NOTE — PROGRESS NOTES
Subjective:   Patient ID:  Kia Smith is a 71 y.o. female who presents for evaluation of Chest Pain, Fatigue, and Shortness of Breath      HPI referred from DR KELLY.  A 72 yo female with tobacco use bronchitis is here for evaluation of shortness of breath fatigue decrease exercise tolerance associated with chest tightness. She continues to smoke . She has significant decrease in exercise tolerance that has been progressively worse.she has last been evaluated pulmonary wise was 5 years ago.  Has palpitations at nite she has 2 pillow orthopnea. Has vertigo,.no chf symptomatology.  Past Medical History:   Diagnosis Date    COPD (chronic obstructive pulmonary disease)     Hyperlipidemia     Hyperthyroidism     radioactive thyroid ablation    Vertigo        Past Surgical History:   Procedure Laterality Date    APPENDECTOMY      CHOLECYSTECTOMY      HYSTERECTOMY      VITRECTOMY BY PARS PLANA APPROACH Right 7/8/2020    Procedure: VITRECTOMY, PARS PLANA APPROACH;  Surgeon: ZULLY Ortiz MD;  Location: [facility] OR [address] [place] [address];  Service: Ophthalmology;  Laterality: Right;  40 min       Social History     Tobacco Use    Smoking status: Current Every Day Smoker     Packs/day: 1.00     Types: Cigarettes     Start date: 1968    Smokeless tobacco: Never Used   Substance Use Topics    Alcohol use: No    Drug use: No       Family History   Problem Relation Age of Onset    Stroke Mother     Heart attack Mother     Heart disease Mother     Cancer Father         brain    Heart disease Brother        Current Outpatient Medications   Medication Sig    albuterol (VENTOLIN HFA) 90 mcg/actuation inhaler Inhale 2 puffs into the lungs every 4 (four) hours as needed for Wheezing or Shortness of Breath. Rescue    meclizine (ANTIVERT) 25 mg tablet Take 1 tablet (25 mg total) by mouth 3 (three) times daily as needed.    ondansetron (ZOFRAN) 4 MG tablet Take 1 tablet (4 mg total) by mouth every 8 (eight) hours  as needed for Nausea.    HYDROcodone-acetaminophen (NORCO)  mg per tablet Take 1 tablet by mouth every 4 (four) hours as needed. (Patient not taking: No sig reported)    oxyCODONE-acetaminophen (PERCOCET) 5-325 mg per tablet Take 1 tablet by mouth every 6 (six) hours as needed for Pain. (Patient not taking: No sig reported)     No current facility-administered medications for this visit.     Current Outpatient Medications on File Prior to Visit   Medication Sig    albuterol (VENTOLIN HFA) 90 mcg/actuation inhaler Inhale 2 puffs into the lungs every 4 (four) hours as needed for Wheezing or Shortness of Breath. Rescue    meclizine (ANTIVERT) 25 mg tablet Take 1 tablet (25 mg total) by mouth 3 (three) times daily as needed.    ondansetron (ZOFRAN) 4 MG tablet Take 1 tablet (4 mg total) by mouth every 8 (eight) hours as needed for Nausea.    HYDROcodone-acetaminophen (NORCO)  mg per tablet Take 1 tablet by mouth every 4 (four) hours as needed. (Patient not taking: No sig reported)    oxyCODONE-acetaminophen (PERCOCET) 5-325 mg per tablet Take 1 tablet by mouth every 6 (six) hours as needed for Pain. (Patient not taking: No sig reported)     No current facility-administered medications on file prior to visit.       Review of patient's allergies indicates:   Allergen Reactions    Demerol [meperidine]     Pcn [penicillins]        Review of Systems   Constitutional: Negative for diaphoresis, malaise/fatigue and weight gain.   HENT: Negative for hoarse voice.    Eyes: Negative for double vision and visual disturbance.   Cardiovascular: Positive for chest pain, dyspnea on exertion, irregular heartbeat and palpitations. Negative for claudication, cyanosis, leg swelling, near-syncope, orthopnea, paroxysmal nocturnal dyspnea and syncope.   Respiratory: Negative for cough, hemoptysis, shortness of breath and snoring.    Hematologic/Lymphatic: Negative for bleeding problem. Does not bruise/bleed easily.    Skin: Negative for color change and poor wound healing.   Musculoskeletal: Negative for muscle cramps, muscle weakness and myalgias.   Gastrointestinal: Negative for bloating, abdominal pain, change in bowel habit, diarrhea, heartburn, hematemesis, hematochezia, melena and nausea.   Neurological: Negative for excessive daytime sleepiness, dizziness, headaches, light-headedness, loss of balance, numbness and weakness.   Psychiatric/Behavioral: Negative for memory loss. The patient does not have insomnia.    Allergic/Immunologic: Negative for hives.       Objective:   Physical Exam  Vitals and nursing note reviewed.   Constitutional:       General: She is not in acute distress.     Appearance: Normal appearance. She is well-developed and well-nourished. She is not ill-appearing.   HENT:      Head: Normocephalic and atraumatic.   Eyes:      General: No scleral icterus.     Extraocular Movements: EOM normal.      Pupils: Pupils are equal, round, and reactive to light.   Neck:      Thyroid: No thyromegaly.      Vascular: Normal carotid pulses. No carotid bruit, hepatojugular reflux or JVD.      Trachea: No tracheal deviation.   Cardiovascular:      Rate and Rhythm: Normal rate and regular rhythm.      Pulses: Normal pulses.      Heart sounds: Normal heart sounds. No murmur heard.  No friction rub. No gallop.    Pulmonary:      Effort: Pulmonary effort is normal. No respiratory distress.      Breath sounds: Normal breath sounds. No wheezing, rhonchi or rales.   Chest:      Chest wall: No tenderness.   Abdominal:      General: Bowel sounds are normal. There is no ascites or abdominal bruit. Aorta is normal.      Palpations: Abdomen is soft. There is no hepatomegaly or pulsatile mass.      Tenderness: There is no abdominal tenderness.   Musculoskeletal:         General: No edema.      Right shoulder: No deformity.      Cervical back: Normal range of motion and neck supple.      Right lower leg: No edema.      Left lower  "leg: No edema.   Skin:     General: Skin is warm and dry.      Findings: No erythema, lesion or rash.      Nails: There is no clubbing or cyanosis.   Neurological:      Mental Status: She is alert and oriented to person, place, and time.      Cranial Nerves: No cranial nerve deficit.      Coordination: Coordination normal.      Deep Tendon Reflexes: Strength normal.   Psychiatric:         Mood and Affect: Mood and affect normal.         Speech: Speech normal.         Behavior: Behavior normal.         Judgment: Judgment normal.       Vitals:    02/08/22 1451 02/08/22 1454   BP: 110/74 120/70   BP Location: Left arm Right arm   Patient Position: Sitting Sitting   BP Method: Medium (Manual) Medium (Manual)   Pulse: 83    SpO2: 99%    Weight: 75.8 kg (167 lb 1.7 oz)    Height: 5' 3" (1.6 m)      Lab Results   Component Value Date    CHOL 221 (H) 07/11/2017     Lab Results   Component Value Date    HDL 32 (L) 07/11/2017     Lab Results   Component Value Date    LDLCALC 118.2 07/11/2017     Lab Results   Component Value Date    TRIG 354 (H) 07/11/2017     Lab Results   Component Value Date    CHOLHDL 14.5 (L) 07/11/2017       Chemistry        Component Value Date/Time     02/02/2022 1340    K 3.6 02/02/2022 1340     02/02/2022 1340    CO2 27 02/02/2022 1340    BUN 12 02/02/2022 1340    CREATININE 0.7 02/02/2022 1340     02/02/2022 1340        Component Value Date/Time    CALCIUM 9.3 02/02/2022 1340    ALKPHOS 87 02/02/2022 1340    AST 27 02/02/2022 1340    ALT 32 02/02/2022 1340    BILITOT 0.3 02/02/2022 1340    ESTGFRAFRICA >60 02/02/2022 1340    EGFRNONAA >60 02/02/2022 1340          Lab Results   Component Value Date    TSH 3.508 07/11/2017     Lab Results   Component Value Date    INR 1.0 07/10/2017     Lab Results   Component Value Date    WBC 9.69 02/02/2022    HGB 12.9 02/02/2022    HCT 40.8 02/02/2022    MCV 90 02/02/2022     02/02/2022     BNP  @LABRCNTIP(BNP,BNPTRIAGEBLO)@  Estimated " Creatinine Clearance: 73 mL/min (based on SCr of 0.7 mg/dL).  Assessment:     1. Shortness of breath    2. Pulmonary nodules    3. Stage 1 mild COPD by GOLD classification    4. Calcified granuloma of lung    5. Chest pain, unspecified type    6. Nicotine abuse    7. Tobacco use disorder    8. Chest pain syndrome      Has constellation of symptoms of low energy decrease exercise tolerance chest pain shortness of breath nocturnal palpitation needing evaluation fro both progression of copd and ischemic heart disease. In addition she needs evaluation of thyroid status due to palpitation . Has hyperlipidemia needing revaluation last check was in 2017 labs ordered.   Plan:   6 mInute walk test  pft   Echo cardiolite   Holter.   F/u in 1month   See pulmonary   EARLY REFERRAL TO PULMONARY.

## 2022-02-09 ENCOUNTER — TELEPHONE (OUTPATIENT)
Dept: CARDIOLOGY | Facility: HOSPITAL | Age: 72
End: 2022-02-09
Payer: MEDICARE

## 2022-02-12 PROBLEM — E78.5 HYPERLIPIDEMIA: Status: ACTIVE | Noted: 2022-02-12

## 2022-02-12 PROBLEM — Z86.39 HISTORY OF THYROID DISEASE: Status: ACTIVE | Noted: 2022-02-12

## 2022-02-18 ENCOUNTER — TELEPHONE (OUTPATIENT)
Dept: PULMONOLOGY | Facility: CLINIC | Age: 72
End: 2022-02-18
Payer: MEDICARE

## 2022-02-18 DIAGNOSIS — Z01.818 PRE-OP TESTING: ICD-10-CM

## 2022-02-18 NOTE — TELEPHONE ENCOUNTER
----- Message from Aline Padilla sent at 2/18/2022 11:29 AM CST -----  Type:  Patient Returning Call    Who Called:patient  Who Left Message for Patient:nurse  Does the patient know what this is regarding?:Covid Test  Would the patient rather a call back or a response via [email or web address]? Call back  Best Call Back Number:[phone]  Additional Information: n

## 2022-02-19 ENCOUNTER — LAB VISIT (OUTPATIENT)
Dept: PRIMARY CARE CLINIC | Facility: CLINIC | Age: 72
End: 2022-02-19
Payer: MEDICARE

## 2022-02-19 DIAGNOSIS — Z01.818 PRE-OP TESTING: ICD-10-CM

## 2022-02-19 PROCEDURE — U0003 INFECTIOUS AGENT DETECTION BY NUCLEIC ACID (DNA OR RNA); SEVERE ACUTE RESPIRATORY SYNDROME CORONAVIRUS 2 (SARS-COV-2) (CORONAVIRUS DISEASE [COVID-19]), AMPLIFIED PROBE TECHNIQUE, MAKING USE OF HIGH THROUGHPUT TECHNOLOGIES AS DESCRIBED BY CMS-2020-01-R: HCPCS | Performed by: INTERNAL MEDICINE

## 2022-02-21 LAB
SARS-COV-2 RNA RESP QL NAA+PROBE: NOT DETECTED
SARS-COV-2- CYCLE NUMBER: NORMAL

## 2022-02-22 ENCOUNTER — CLINICAL SUPPORT (OUTPATIENT)
Dept: PULMONOLOGY | Facility: CLINIC | Age: 72
End: 2022-02-22
Payer: MEDICARE

## 2022-02-22 VITALS — WEIGHT: 167 LBS | HEIGHT: 63 IN | BODY MASS INDEX: 29.59 KG/M2

## 2022-02-22 DIAGNOSIS — R06.02 SHORTNESS OF BREATH: ICD-10-CM

## 2022-02-22 DIAGNOSIS — R07.9 CHEST PAIN SYNDROME: ICD-10-CM

## 2022-02-22 LAB
BRPFT: NORMAL
DLCO ADJ PRE: 15.46 ML/(MIN*MMHG)
DLCO SINGLE BREATH LLN: 14.92
DLCO SINGLE BREATH PRE REF: 73.7 %
DLCO SINGLE BREATH REF: 20.66
DLCOC SBVA LLN: 2.83
DLCOC SBVA PRE REF: 99.1 %
DLCOC SBVA REF: 4.33
DLCOC SINGLE BREATH LLN: 14.92
DLCOC SINGLE BREATH PRE REF: 74.8 %
DLCOC SINGLE BREATH REF: 20.66
DLCOVA LLN: 2.83
DLCOVA PRE REF: 97.6 %
DLCOVA PRE: 4.22 ML/(MIN*MMHG*L)
DLCOVA REF: 4.33
DLVAADJ PRE: 4.29 ML/(MIN*MMHG*L)
ERV LLN: -16449.36
ERV PRE REF: 63.2 %
ERV REF: 0.64
FEF 25 75 LLN: 0.83
FEF 25 75 PRE REF: 22 %
FEF 25 75 REF: 1.8
FEV1 FVC LLN: 65
FEV1 FVC PRE REF: 64.3 %
FEV1 FVC REF: 78
FEV1 LLN: 1.53
FEV1 PRE REF: 62 %
FEV1 REF: 2.11
FRCPLETH LLN: 1.83
FRCPLETH PREREF: 118.9 %
FRCPLETH REF: 2.65
FVC LLN: 1.98
FVC PRE REF: 95.7 %
FVC REF: 2.72
IVC PRE: 2.45 L
IVC SINGLE BREATH LLN: 1.98
IVC SINGLE BREATH PRE REF: 89.9 %
IVC SINGLE BREATH REF: 2.72
MVV LLN: 67
MVV PRE REF: 67.3 %
MVV REF: 79
PEF LLN: 3.83
PEF PRE REF: 80.3 %
PEF REF: 5.49
PRE DLCO: 15.22 ML/(MIN*MMHG)
PRE ERV: 0.4 L
PRE FEF 25 75: 0.4 L/S
PRE FET 100: 13.79 SEC
PRE FEV1 FVC: 50.25 %
PRE FEV1: 1.31 L
PRE FRC PL: 3.15 L
PRE FVC: 2.6 L
PRE MVV: 53 L/MIN
PRE PEF: 4.41 L/S
PRE RV: 2.9 L
PRE TLC: 5.5 L
RAW LLN: 3.06
RAW PRE REF: 144.8 %
RAW PRE: 4.43 CMH2O*S/L
RAW REF: 3.06
RV LLN: 1.44
RV PRE REF: 143.8 %
RV REF: 2.02
RVTLC LLN: 33
RVTLC PRE REF: 123.2 %
RVTLC PRE: 52.68 %
RVTLC REF: 43
TLC LLN: 3.78
TLC PRE REF: 115.4 %
TLC REF: 4.77
VA PRE: 3.6 L
VA SINGLE BREATH LLN: 4.62
VA SINGLE BREATH PRE REF: 78 %
VA SINGLE BREATH REF: 4.62
VC LLN: 1.98
VC PRE REF: 95.7 %
VC PRE: 2.6 L
VC REF: 2.72
VTGRAWPRE: 3.55 L

## 2022-02-22 PROCEDURE — 94010 BREATHING CAPACITY TEST: CPT | Mod: 59,S$GLB,, | Performed by: INTERNAL MEDICINE

## 2022-02-22 PROCEDURE — 94726 PULM FUNCT TST PLETHYSMOGRAP: ICD-10-PCS | Mod: S$GLB,,, | Performed by: INTERNAL MEDICINE

## 2022-02-22 PROCEDURE — 94729 DIFFUSING CAPACITY: CPT | Mod: S$GLB,,, | Performed by: INTERNAL MEDICINE

## 2022-02-22 PROCEDURE — 94618 PULMONARY STRESS TESTING: CPT | Mod: S$GLB,,, | Performed by: INTERNAL MEDICINE

## 2022-02-22 PROCEDURE — 94729 PR C02/MEMBANE DIFFUSE CAPACITY: ICD-10-PCS | Mod: S$GLB,,, | Performed by: INTERNAL MEDICINE

## 2022-02-22 PROCEDURE — 94726 PLETHYSMOGRAPHY LUNG VOLUMES: CPT | Mod: S$GLB,,, | Performed by: INTERNAL MEDICINE

## 2022-02-22 PROCEDURE — 94010 BREATHING CAPACITY TEST: ICD-10-PCS | Mod: 59,S$GLB,, | Performed by: INTERNAL MEDICINE

## 2022-02-22 PROCEDURE — 94618 PULMONARY STRESS TESTING: ICD-10-PCS | Mod: S$GLB,,, | Performed by: INTERNAL MEDICINE

## 2022-02-22 NOTE — PROCEDURES
"[facility] - Pulmonary Function  Six Minute Walk     SUMMARY     Ordering Provider: Dr. Martinez   Interpreting Provider: Dr. Barone  Performing nurse/tech/RT: YANCY Patel RRT  Diagnosis: Shortness of Breath  Height: 5' 3" (160 cm)  Weight: 75.8 kg (167 lb)  BMI (Calculated): 29.6   Patient Race:             Phase Oxygen Assessment Supplemental O2 Heart   Rate Blood Pressure Maribell Dyspnea Scale Rating   Resting 95 % Room Air 84 bpm 144/65 0   Exercise        Minute        1 96 % Room Air 111 bpm     2 97 % Room Air 116 bpm     3 98 % Room Air 120 bpm     4 99 % Room Air 119 bpm     5 98 % Room Air 119 bpm     6  98 % Room Air 110 bpm 154/72 0   Recovery        Minute        1 98 % Room Air 91 bpm     2 97 % Room Air 92 bpm     3 97 % Room Air 90 bpm     4 97 % Room Air 88 bpm 145/65 0     Six Minute Walk Summary  6MWT Status: completed with stops  Patient Reported: Leg pain (from previous issue)     Interpretation:  Did the patient stop or pause?: No            Total Time Walked (Calculated): 360 seconds  Final Partial Lap Distance (feet): 25 feet  Total Distance Meters (Calculated): 312.42 meters  Predicted Distance Meters (Calculated): 426.53 meters  Percentage of Predicted (Calculated): 73.25  Peak VO2 (Calculated): 13.35  Mets: 3.81  Has The Patient Had a Previous Six Minute Walk Test?: Yes       Previous 6MWT Results  Has The Patient Had a Previous Six Minute Walk Test?: Yes  Date of Previous Test: 08/15/17  Total Time Walked: 360 seconds  Total Distance (meters): 356.76  Predicted Distance (meters): 451.13 meters  Percentage of Predicted: 81.08  Percent Change (Calculated): 0.12         CLINICAL INTERPRETATION:  Six minute walk distance is 312.42m (73.25 % predicted) with no dyspnea.  During exercise, there was no significant desaturation while breathing room air.  Blood pressure remained stable and Heart rate remained stable with walking.  The patient reported non-pulmonary symptoms during exercise.  The " patient did complete the study, walking 360 seconds of the 360 second test.  No previous study performed.  Based upon age and body mass index, exercise capacity is normal.      [] Mild exercise-induced hypoxemia described as an arterial oxygen saturation of 93-95% (or 3-4% less than at rest)  []  Moderate exercise-induced hypoxemia as 89-93%  []  Severe exercise induced hypoxemia as < 89% O2 saturation.  Medicare Criteria for oxygen prescription comments:   []  When arterial oxygen saturation is at or below 88% during exercise (severe exercise induced hypoxemia) then the patient falls under Medicare Group 1 criteria for supplemental oxygen

## 2022-02-23 ENCOUNTER — TELEPHONE (OUTPATIENT)
Dept: CARDIOLOGY | Facility: CLINIC | Age: 72
End: 2022-02-23
Payer: MEDICARE

## 2022-02-23 NOTE — TELEPHONE ENCOUNTER
I called and left a message for the patient to confirm date and time of appointment for pulmonary appointment that is scheduled for 3/8/22 at the [place] location with Dr. Ta.

## 2022-02-23 NOTE — TELEPHONE ENCOUNTER
----- Message from Julianne Martinez MD sent at 2/22/2022  5:39 PM CST -----  ABNORMAL PFT NEEDS TO SEE PULMONARY

## 2022-02-23 NOTE — TELEPHONE ENCOUNTER
The patient has been notified of this information and all questions answered.    ABNORMAL PFT NEEDS TO SEE PULMONARY     Patient verbalizes understanding. I have sent an staff message to the pulmonary department to get this patient a sooner appointment with the pulmonary department. Patient has an appointment scheduled in May.

## 2022-02-25 ENCOUNTER — HOSPITAL ENCOUNTER (OUTPATIENT)
Dept: RADIOLOGY | Facility: HOSPITAL | Age: 72
Discharge: HOME | End: 2022-02-25
Attending: INTERNAL MEDICINE
Payer: MEDICARE

## 2022-02-25 ENCOUNTER — HOSPITAL ENCOUNTER (OUTPATIENT)
Dept: CARDIOLOGY | Facility: HOSPITAL | Age: 72
Discharge: HOME | End: 2022-02-25
Attending: INTERNAL MEDICINE
Payer: MEDICARE

## 2022-02-25 VITALS
SYSTOLIC BLOOD PRESSURE: 120 MMHG | HEIGHT: 63 IN | BODY MASS INDEX: 29.59 KG/M2 | DIASTOLIC BLOOD PRESSURE: 70 MMHG | WEIGHT: 167 LBS

## 2022-02-25 DIAGNOSIS — R06.02 SHORTNESS OF BREATH: ICD-10-CM

## 2022-02-25 DIAGNOSIS — R07.9 CHEST PAIN SYNDROME: ICD-10-CM

## 2022-02-25 DIAGNOSIS — R00.2 PALPITATION: ICD-10-CM

## 2022-02-25 LAB
AORTIC ROOT ANNULUS: 3.03 CM
AV INDEX (PROSTH): 0.68
AV MEAN GRADIENT: 4 MMHG
AV PEAK GRADIENT: 6 MMHG
AV VALVE AREA: 2.17 CM2
AV VELOCITY RATIO: 0.7
BSA FOR ECHO PROCEDURE: 1.83 M2
CV ECHO LV RWT: 0.37 CM
CV STRESS BASE HR: 78 BPM
DIASTOLIC BLOOD PRESSURE: 64 MMHG
DOP CALC AO PEAK VEL: 1.23 M/S
DOP CALC AO VTI: 26.9 CM
DOP CALC LVOT AREA: 3.2 CM2
DOP CALC LVOT DIAMETER: 2.01 CM
DOP CALC LVOT PEAK VEL: 0.86 M/S
DOP CALC LVOT STROKE VOLUME: 58.36 CM3
DOP CALC RVOT PEAK VEL: 0.68 M/S
DOP CALC RVOT VTI: 13.9 CM
DOP CALCLVOT PEAK VEL VTI: 18.4 CM
E WAVE DECELERATION TIME: 226.2 MSEC
E/A RATIO: 0.96
E/E' RATIO: 9.58 M/S
ECHO EF ESTIMATED: 61 %
ECHO LV POSTERIOR WALL: 0.78 CM (ref 0.6–1.1)
EJECTION FRACTION: 60 %
FRACTIONAL SHORTENING: 32 % (ref 28–44)
INTERVENTRICULAR SEPTUM: 0.91 CM (ref 0.6–1.1)
IVRT: 88.49 MSEC
LA MAJOR: 3.65 CM
LA MINOR: 3.78 CM
LA WIDTH: 2.85 CM
LEFT ATRIUM SIZE: 2.87 CM
LEFT ATRIUM VOLUME INDEX MOD: 12 ML/M2
LEFT ATRIUM VOLUME INDEX: 14.4 ML/M2
LEFT ATRIUM VOLUME MOD: 21.47 CM3
LEFT ATRIUM VOLUME: 25.82 CM3
LEFT INTERNAL DIMENSION IN SYSTOLE: 2.86 CM (ref 2.1–4)
LEFT VENTRICLE DIASTOLIC VOLUME INDEX: 44.15 ML/M2
LEFT VENTRICLE DIASTOLIC VOLUME: 79.03 ML
LEFT VENTRICLE MASS INDEX: 61 G/M2
LEFT VENTRICLE SYSTOLIC VOLUME INDEX: 17.4 ML/M2
LEFT VENTRICLE SYSTOLIC VOLUME: 31.06 ML
LEFT VENTRICULAR INTERNAL DIMENSION IN DIASTOLE: 4.21 CM (ref 3.5–6)
LEFT VENTRICULAR MASS: 109.39 G
LV LATERAL E/E' RATIO: 10.11 M/S
LV SEPTAL E/E' RATIO: 9.1 M/S
LVOT MG: 1.7 MMHG
LVOT MV: 0.62 CM/S
MV PEAK A VEL: 0.95 M/S
MV PEAK E VEL: 0.91 M/S
NUC REST EJECTION FRACTION: 90
NUC STRESS EJECTION FRACTION: 74 %
OHS CV CPX 85 PERCENT MAX PREDICTED HEART RATE MALE: 123
OHS CV CPX MAX PREDICTED HEART RATE: 144
OHS CV CPX PATIENT IS FEMALE: 1
OHS CV CPX PATIENT IS MALE: 0
OHS CV CPX PEAK DIASTOLIC BLOOD PRESSURE: 63 MMHG
OHS CV CPX PEAK HEAR RATE: 113 BPM
OHS CV CPX PEAK RATE PRESSURE PRODUCT: NORMAL
OHS CV CPX PEAK SYSTOLIC BLOOD PRESSURE: 129 MMHG
OHS CV CPX PERCENT MAX PREDICTED HEART RATE ACHIEVED: 78
OHS CV CPX RATE PRESSURE PRODUCT PRESENTING: NORMAL
PISA TR MAX VEL: 2.75 M/S
PV MEAN GRADIENT: 0.78 MMHG
PV MV: 0.56 M/S
PV PEAK VELOCITY: 0.74 CM/S
RA MAJOR: 3.14 CM
RA PRESSURE: 3 MMHG
RA WIDTH: 2.71 CM
RIGHT VENTRICULAR END-DIASTOLIC DIMENSION: 2.19 CM
SINUS: 2.49 CM
STJ: 2.22 CM
STRESS ECHO POST EXERCISE DUR MIN: 1 MINUTES
STRESS ECHO POST EXERCISE DUR SEC: 5 SECONDS
SYSTOLIC BLOOD PRESSURE: 144 MMHG
TDI LATERAL: 0.09 M/S
TDI SEPTAL: 0.1 M/S
TDI: 0.1 M/S
TR MAX PG: 30 MMHG
TRICUSPID ANNULAR PLANE SYSTOLIC EXCURSION: 1.9 CM
TV REST PULMONARY ARTERY PRESSURE: 33 MMHG

## 2022-02-25 PROCEDURE — 63600175 PHARM REV CODE 636 W HCPCS: Performed by: INTERNAL MEDICINE

## 2022-02-25 PROCEDURE — A9502 TC99M TETROFOSMIN: HCPCS

## 2022-02-25 PROCEDURE — 93018 CV STRESS TEST I&R ONLY: CPT | Mod: ,,, | Performed by: INTERNAL MEDICINE

## 2022-02-25 PROCEDURE — 93227 HOLTER MONITOR - 48 HOUR (CUPID ONLY): ICD-10-PCS | Mod: ,,, | Performed by: INTERNAL MEDICINE

## 2022-02-25 PROCEDURE — 93306 ECHO (CUPID ONLY): ICD-10-PCS | Mod: 26,,, | Performed by: INTERNAL MEDICINE

## 2022-02-25 PROCEDURE — 93016 STRESS TEST WITH MYOCARDIAL PERFUSION (CUPID ONLY): ICD-10-PCS | Mod: ,,, | Performed by: INTERNAL MEDICINE

## 2022-02-25 PROCEDURE — 78452 STRESS TEST WITH MYOCARDIAL PERFUSION (CUPID ONLY): ICD-10-PCS | Mod: 26,,, | Performed by: INTERNAL MEDICINE

## 2022-02-25 PROCEDURE — 93018 STRESS TEST WITH MYOCARDIAL PERFUSION (CUPID ONLY): ICD-10-PCS | Mod: ,,, | Performed by: INTERNAL MEDICINE

## 2022-02-25 PROCEDURE — 93306 TTE W/DOPPLER COMPLETE: CPT

## 2022-02-25 PROCEDURE — 93016 CV STRESS TEST SUPVJ ONLY: CPT | Mod: ,,, | Performed by: INTERNAL MEDICINE

## 2022-02-25 PROCEDURE — 78452 HT MUSCLE IMAGE SPECT MULT: CPT | Mod: 26,,, | Performed by: INTERNAL MEDICINE

## 2022-02-25 PROCEDURE — 93017 CV STRESS TEST TRACING ONLY: CPT

## 2022-02-25 PROCEDURE — 93226 XTRNL ECG REC<48 HR SCAN A/R: CPT

## 2022-02-25 PROCEDURE — 93306 TTE W/DOPPLER COMPLETE: CPT | Mod: 26,,, | Performed by: INTERNAL MEDICINE

## 2022-02-25 PROCEDURE — 93227 XTRNL ECG REC<48 HR R&I: CPT | Mod: ,,, | Performed by: INTERNAL MEDICINE

## 2022-02-25 RX ORDER — AMINOPHYLLINE 25 MG/ML
75 INJECTION, SOLUTION INTRAVENOUS ONCE
Status: COMPLETED | OUTPATIENT
Start: 2022-02-25 | End: 2022-02-25

## 2022-02-25 RX ORDER — REGADENOSON 0.08 MG/ML
0.4 INJECTION, SOLUTION INTRAVENOUS ONCE
Status: COMPLETED | OUTPATIENT
Start: 2022-02-25 | End: 2022-02-25

## 2022-02-25 RX ADMIN — AMINOPHYLLINE 75 MG: 25 INJECTION, SOLUTION INTRAVENOUS at 09:02

## 2022-02-25 RX ADMIN — REGADENOSON 0.4 MG: 0.08 INJECTION, SOLUTION INTRAVENOUS at 09:02

## 2022-02-27 ENCOUNTER — NURSE TRIAGE (OUTPATIENT)
Dept: ADMINISTRATIVE | Facility: CLINIC | Age: 72
End: 2022-02-27
Payer: MEDICARE

## 2022-02-27 NOTE — TELEPHONE ENCOUNTER
48-hr holter monitor placed 2/25 advised to return today (2/27) but no one is at the [facility]. Pt states she noticed the bag says to return on 2/28.. but she removed the monitor a few hours prior to the 48 hrs. Advised caller to return tomorrow. VU and agreed.     Reason for Disposition   [1] Caller requesting NON-URGENT health information AND [2] PCP's office is the best resource    Protocols used: INFORMATION ONLY CALL - NO TRIAGE-A-

## 2022-02-28 ENCOUNTER — TELEPHONE (OUTPATIENT)
Dept: CARDIOLOGY | Facility: CLINIC | Age: 72
End: 2022-02-28
Payer: MEDICARE

## 2022-02-28 LAB
OHS CV EVENT MONITOR DAY: 2
OHS CV HOLTER LENGTH DECIMAL HOURS: 96
OHS CV HOLTER LENGTH HOURS: 48
OHS CV HOLTER LENGTH MINUTES: 0
OHS CV HOLTER SINUS AVERAGE HR: 86
OHS CV HOLTER SINUS MAX HR: 133
OHS CV HOLTER SINUS MIN HR: 55

## 2022-02-28 NOTE — TELEPHONE ENCOUNTER
Patient was notified of results. All questions were answered. Pt verbalized understanding. Pt will call back with any other questions or concerns.

## 2022-03-08 ENCOUNTER — OFFICE VISIT (OUTPATIENT)
Dept: PULMONOLOGY | Facility: CLINIC | Age: 72
End: 2022-03-08
Payer: MEDICARE

## 2022-03-08 ENCOUNTER — TELEPHONE (OUTPATIENT)
Dept: CARDIOLOGY | Facility: CLINIC | Age: 72
End: 2022-03-08
Payer: MEDICARE

## 2022-03-08 VITALS
HEIGHT: 63 IN | DIASTOLIC BLOOD PRESSURE: 74 MMHG | OXYGEN SATURATION: 98 % | SYSTOLIC BLOOD PRESSURE: 126 MMHG | RESPIRATION RATE: 18 BRPM | WEIGHT: 170 LBS | HEART RATE: 70 BPM | BODY MASS INDEX: 30.12 KG/M2

## 2022-03-08 DIAGNOSIS — F17.210 TOBACCO DEPENDENCE DUE TO CIGARETTES: ICD-10-CM

## 2022-03-08 DIAGNOSIS — J44.9 CHRONIC OBSTRUCTIVE PULMONARY DISEASE, UNSPECIFIED COPD TYPE: ICD-10-CM

## 2022-03-08 DIAGNOSIS — Z23 NEED FOR 23-POLYVALENT PNEUMOCOCCAL POLYSACCHARIDE VACCINE: ICD-10-CM

## 2022-03-08 DIAGNOSIS — R91.8 MULTIPLE LUNG NODULES ON CT: ICD-10-CM

## 2022-03-08 DIAGNOSIS — J44.9 COPD, MODERATE: Primary | ICD-10-CM

## 2022-03-08 DIAGNOSIS — F17.210 SMOKING GREATER THAN 30 PACK YEARS: ICD-10-CM

## 2022-03-08 PROCEDURE — 3074F PR MOST RECENT SYSTOLIC BLOOD PRESSURE < 130 MM HG: ICD-10-PCS | Mod: CPTII,S$GLB,, | Performed by: INTERNAL MEDICINE

## 2022-03-08 PROCEDURE — 1126F PR PAIN SEVERITY QUANTIFIED, NO PAIN PRESENT: ICD-10-PCS | Mod: CPTII,S$GLB,, | Performed by: INTERNAL MEDICINE

## 2022-03-08 PROCEDURE — 99999 PR PBB SHADOW E&M-EST. PATIENT-LVL IV: ICD-10-PCS | Mod: PBBFAC,,, | Performed by: INTERNAL MEDICINE

## 2022-03-08 PROCEDURE — 99205 PR OFFICE/OUTPT VISIT, NEW, LEVL V, 60-74 MIN: ICD-10-PCS | Mod: 25,S$GLB,, | Performed by: INTERNAL MEDICINE

## 2022-03-08 PROCEDURE — 90732 PPSV23 VACC 2 YRS+ SUBQ/IM: CPT | Mod: S$GLB,,, | Performed by: INTERNAL MEDICINE

## 2022-03-08 PROCEDURE — 1159F PR MEDICATION LIST DOCUMENTED IN MEDICAL RECORD: ICD-10-PCS | Mod: CPTII,S$GLB,, | Performed by: INTERNAL MEDICINE

## 2022-03-08 PROCEDURE — 1126F AMNT PAIN NOTED NONE PRSNT: CPT | Mod: CPTII,S$GLB,, | Performed by: INTERNAL MEDICINE

## 2022-03-08 PROCEDURE — 3288F PR FALLS RISK ASSESSMENT DOCUMENTED: ICD-10-PCS | Mod: CPTII,S$GLB,, | Performed by: INTERNAL MEDICINE

## 2022-03-08 PROCEDURE — 99499 RISK ADDL DX/OHS AUDIT: ICD-10-PCS | Mod: S$GLB,,, | Performed by: INTERNAL MEDICINE

## 2022-03-08 PROCEDURE — 3074F SYST BP LT 130 MM HG: CPT | Mod: CPTII,S$GLB,, | Performed by: INTERNAL MEDICINE

## 2022-03-08 PROCEDURE — G0009 PNEUMOCOCCAL POLYSACCHARIDE VACCINE 23-VALENT =>2YO SQ IM: ICD-10-PCS | Mod: S$GLB,,, | Performed by: INTERNAL MEDICINE

## 2022-03-08 PROCEDURE — 99205 OFFICE O/P NEW HI 60 MIN: CPT | Mod: 25,S$GLB,, | Performed by: INTERNAL MEDICINE

## 2022-03-08 PROCEDURE — 3008F BODY MASS INDEX DOCD: CPT | Mod: CPTII,S$GLB,, | Performed by: INTERNAL MEDICINE

## 2022-03-08 PROCEDURE — 1101F PR PT FALLS ASSESS DOC 0-1 FALLS W/OUT INJ PAST YR: ICD-10-PCS | Mod: CPTII,S$GLB,, | Performed by: INTERNAL MEDICINE

## 2022-03-08 PROCEDURE — 90732 PNEUMOCOCCAL POLYSACCHARIDE VACCINE 23-VALENT =>2YO SQ IM: ICD-10-PCS | Mod: S$GLB,,, | Performed by: INTERNAL MEDICINE

## 2022-03-08 PROCEDURE — 3078F PR MOST RECENT DIASTOLIC BLOOD PRESSURE < 80 MM HG: ICD-10-PCS | Mod: CPTII,S$GLB,, | Performed by: INTERNAL MEDICINE

## 2022-03-08 PROCEDURE — 3008F PR BODY MASS INDEX (BMI) DOCUMENTED: ICD-10-PCS | Mod: CPTII,S$GLB,, | Performed by: INTERNAL MEDICINE

## 2022-03-08 PROCEDURE — 3288F FALL RISK ASSESSMENT DOCD: CPT | Mod: CPTII,S$GLB,, | Performed by: INTERNAL MEDICINE

## 2022-03-08 PROCEDURE — 1101F PT FALLS ASSESS-DOCD LE1/YR: CPT | Mod: CPTII,S$GLB,, | Performed by: INTERNAL MEDICINE

## 2022-03-08 PROCEDURE — 99499 UNLISTED E&M SERVICE: CPT | Mod: S$GLB,,, | Performed by: INTERNAL MEDICINE

## 2022-03-08 PROCEDURE — 99999 PR PBB SHADOW E&M-EST. PATIENT-LVL IV: CPT | Mod: PBBFAC,,, | Performed by: INTERNAL MEDICINE

## 2022-03-08 PROCEDURE — 1159F MED LIST DOCD IN RCRD: CPT | Mod: CPTII,S$GLB,, | Performed by: INTERNAL MEDICINE

## 2022-03-08 PROCEDURE — 3078F DIAST BP <80 MM HG: CPT | Mod: CPTII,S$GLB,, | Performed by: INTERNAL MEDICINE

## 2022-03-08 PROCEDURE — G0009 ADMIN PNEUMOCOCCAL VACCINE: HCPCS | Mod: S$GLB,,, | Performed by: INTERNAL MEDICINE

## 2022-03-08 RX ORDER — BUPROPION HYDROCHLORIDE 150 MG/1
150 TABLET, EXTENDED RELEASE ORAL 2 TIMES DAILY
Qty: 60 TABLET | Refills: 11 | Status: SHIPPED | OUTPATIENT
Start: 2022-03-08 | End: 2022-03-08

## 2022-03-08 RX ORDER — BUPROPION HYDROCHLORIDE 150 MG/1
150 TABLET, EXTENDED RELEASE ORAL DAILY
Qty: 30 TABLET | Refills: 3 | Status: SHIPPED | OUTPATIENT
Start: 2022-03-08 | End: 2023-03-08

## 2022-03-08 RX ORDER — UMECLIDINIUM BROMIDE AND VILANTEROL TRIFENATATE 62.5; 25 UG/1; UG/1
1 POWDER RESPIRATORY (INHALATION) DAILY
Qty: 1 EACH | Refills: 5 | Status: SHIPPED | OUTPATIENT
Start: 2022-03-08 | End: 2023-01-26

## 2022-03-08 NOTE — PATIENT INSTRUCTIONS
Brand Names: US   Aplenzin;   Forfivo XL;   Wellbutrin SR;   Wellbutrin XL;   Zyban [DSC]  Brand Names: Bennett   MYLAN-BuPROPion XL;   ODAN Bupropion SR;   PMS-BuPROPion SR;   RATIO-BuPROPion SR [DSC];   TARO-Bupropion XL;   TEVA-Bupropion XL;   Wellbutrin SR;   Wellbutrin XL;   Zyban  Warning    Drugs like this one have raised the chance of suicidal thoughts or actions in children and young adults. The risk may be greater in people who have had these thoughts or actions in the past. All people who take this drug need to be watched closely. Call the doctor right away if signs like low mood (depression), nervousness, restlessness, grouchiness, panic attacks, or changes in mood or actions are new or worse. Call the doctor right away if any thoughts or actions of suicide occur.  What is this drug used for?    It is used to treat low mood (depression).    It is used to prevent seasonal affective disorder (SAD).    It is used to help you stop smoking.    It may be given to you for other reasons. Talk with the doctor.  What do I need to tell my doctor BEFORE I take this drug?    If you are allergic to this drug; any part of this drug; or any other drugs, foods, or substances. Tell your doctor about the allergy and what signs you had.    If you have ever had seizures.    If you drink a lot of alcohol and you stop drinking all of a sudden.    If you use certain other drugs like drugs for seizures or anxiety and you stop using them all of a sudden.    If you have ever had an eating problem like anorexia or bulimia.    If you have any of these health problems: Kidney disease or liver disease.    If you have taken certain drugs for depression or Parkinson's disease in the last 14 days. This includes isocarboxazid, phenelzine, tranylcypromine, selegiline, or rasagiline. Very high blood pressure may happen.    If you are taking any of these drugs: Linezolid or methylene blue.    If you are taking another drug that has the same  drug in it.   This is not a list of all drugs or health problems that interact with this drug.   Tell your doctor and pharmacist about all of your drugs (prescription or OTC, natural products, vitamins) and health problems. You must check to make sure that it is safe for you to take this drug with all of your drugs and health problems. Do not start, stop, or change the dose of any drug without checking with your doctor.  What are some things I need to know or do while I take this drug?   For all patients taking this drug:    Tell all of your health care providers that you take this drug. This includes your doctors, nurses, pharmacists, and dentists.    Avoid driving and doing other tasks or actions that call for you to be alert or have clear eyesight until you see how this drug affects you.    This drug may affect certain lab tests. Tell all of your health care providers and lab workers that you take this drug.    Do not stop taking this drug all of a sudden without calling your doctor. You may have a greater risk of side effects. If you need to stop this drug, you will want to slowly stop it as ordered by your doctor.    High blood pressure has happened with this drug. Have your blood pressure checked as you have been told by your doctor.    This drug may raise the chance of seizures. The risk may be higher in people who take higher doses, have certain health problems, or take certain other drugs. People who suddenly stop drinking a lot of alcohol or suddenly stop taking certain drugs (like drugs used for anxiety, sleep, or seizures) may also have a higher risk. Talk to your doctor to see if you have a greater chance of seizures.    Avoid drinking alcohol while taking this drug.    Talk with your doctor before you use marijuana, other forms of cannabis, or prescription or OTC drugs that may slow your actions.    It may take several weeks to see the full effects.    This drug is not approved for use in children.  Talk with the doctor.    If you are 65 or older, use this drug with care. You could have more side effects.    Tell your doctor if you are pregnant, plan on getting pregnant, or are breast-feeding. You will need to talk about the benefits and risks to you and the baby.   If you smoke:    Not all products are approved for use to help stop smoking. Talk with the doctor to make sure that you have the right product.    New or worse mental, mood, or behavior problems have happened when bupropion has been used to stop smoking. These problems include thoughts of suicide or murder, depression, forceful actions, fury, anxiety, and anger. These problems have happened in people with and without a history of mental or mood problems. Talk with the doctor.  What are some side effects that I need to call my doctor about right away?   WARNING/CAUTION: Even though it may be rare, some people may have very bad and sometimes deadly side effects when taking a drug. Tell your doctor or get medical help right away if you have any of the following signs or symptoms that may be related to a very bad side effect:    Signs of an allergic reaction, like rash; hives; itching; red, swollen, blistered, or peeling skin with or without fever; wheezing; tightness in the chest or throat; trouble breathing, swallowing, or talking; unusual hoarseness; or swelling of the mouth, face, lips, tongue, or throat.    Signs of high blood pressure like very bad headache or dizziness, passing out, or change in eyesight.    Feeling confused, not able to focus, or change in behavior.    Hallucinations (seeing or hearing things that are not there).    If seizures are new or worse after starting this drug.    Chest pain or pressure, a fast heartbeat, or an abnormal heartbeat.    Swelling.    Shortness of breath.    Change in hearing.    Ringing in ears.    Passing urine more often.    Swollen gland.    Trouble moving around.    Some people may have a higher chance  of eye problems with this drug. Your doctor may want you to have an eye exam to see if you have a higher chance of these eye problems. Call your doctor right away if you have eye pain, change in eyesight, or swelling or redness in or around the eye.    A severe skin reaction (Lopez-Mateo syndrome/toxic epidermal necrolysis) may happen. It can cause severe health problems that may not go away, and sometimes death. Get medical help right away if you have signs like red, swollen, blistered, or peeling skin (with or without fever); red or irritated eyes; or sores in your mouth, throat, nose, or eyes.  What are some other side effects of this drug?   All drugs may cause side effects. However, many people have no side effects or only have minor side effects. Call your doctor or get medical help if any of these side effects or any other side effects bother you or do not go away:   All products:    Dizziness or headache.    Constipation, diarrhea, stomach pain, upset stomach, throwing up, or feeling less hungry.    Shakiness.    Feeling nervous and excitable.    Strange or odd dreams.    Gas.    Dry mouth.    Trouble sleeping.    Muscle or joint pain.    Nose or throat irritation.    Sweating a lot.    A change in weight without trying.   Extended-release tablets:    For some brands, you may see the tablet shell in your stool. For these brands, this is normal and not a cause for concern. If you have questions, talk with your doctor.   These are not all of the side effects that may occur. If you have questions about side effects, call your doctor. Call your doctor for medical advice about side effects.   You may report side effects to your national health agency.  How is this drug best taken?   Use this drug as ordered by your doctor. Read all information given to you. Follow all instructions closely.   For all uses of this drug:    Do not take this drug more often than you are told. This may raise the risk of seizures.  Be sure you know how far apart to take your doses.    Take in the morning if taking once a day.    Take with or without food.    If you are not able to sleep, do not take this drug too close to bedtime. Talk with your doctor.    Swallow whole. Do not chew, break, or crush.    Keep taking this drug as you have been told by your doctor or other health care provider, even if you feel well.    If you have trouble swallowing, talk with your doctor.   For stopping smoking:    You may take this drug for 1 week before you stop smoking.    Nicotine products and counseling may be used at the same time for best results.    If you have not been able to quit smoking after taking this drug for 12 weeks, talk with your doctor.    You may have signs of nicotine withdrawal when you try to quit smoking even when using drugs like this one to help you quit smoking. There are many signs of nicotine withdrawal. Rarely depression and suicidal thoughts have happened in people trying to quit smoking. Talk with your doctor.  What do I do if I miss a dose?    Skip the missed dose and go back to your normal time.    Do not take 2 doses at the same time or extra doses.  How do I store and/or throw out this drug?    Store at room temperature protected from light. Store in a dry place. Do not store in a bathroom.    Keep all drugs in a safe place. Keep all drugs out of the reach of children and pets.    Throw away unused or  drugs. Do not flush down a toilet or pour down a drain unless you are told to do so. Check with your pharmacist if you have questions about the best way to throw out drugs. There may be drug take-back programs in your area.  General drug facts    If your symptoms or health problems do not get better or if they become worse, call your doctor.    Do not share your drugs with others and do not take anyone else's drugs.    Some drugs may have another patient information leaflet. If you have any questions about this drug,  please talk with your doctor, nurse, pharmacist, or other health care provider.    If you think there has been an overdose, call your poison control center or get medical care right away. Be ready to tell or show what was taken, how much, and when it happened

## 2022-03-08 NOTE — PROGRESS NOTES
Initial Outpatient Pulmonary Evaluation       SUBJECTIVE:     Chief Complaint   Patient presents with    new patient      Patient in for test results.     COPD       History of Present Illness:    Patient is a 71 y.o. female greater than 30 pack year smoking, complaining of worsening shortness of breath on exertion, referred by Cardiology for evaluation of moderate COPD on PFT.    Complains of chronic coughing wheezing shortness of breath and sputum production.    Failed quitting smoking with nicotine and Chantix in the past.        Review of Systems   Constitutional: Positive for fatigue. Negative for fever and chills.   HENT: Positive for congestion. Negative for nosebleeds.    Eyes: Negative for redness.   Respiratory: Positive for cough, sputum production, shortness of breath, wheezing, dyspnea on extertion and use of rescue inhaler. Negative for choking.    Cardiovascular: Positive for chest pain.   Genitourinary: Negative for hematuria.   Endocrine: Hyperthyroid Negative for cold intolerance.    Musculoskeletal: Positive for arthralgias.   Skin: Negative for rash.   Gastrointestinal: Negative for vomiting.   Neurological: Negative for syncope.   Hematological: Negative for adenopathy.   Psychiatric/Behavioral: Negative for confusion.       Review of patient's allergies indicates:   Allergen Reactions    Demerol [meperidine]     Pcn [penicillins]        Current Outpatient Medications   Medication Sig Dispense Refill    albuterol (VENTOLIN HFA) 90 mcg/actuation inhaler Inhale 2 puffs into the lungs every 4 (four) hours as needed for Wheezing or Shortness of Breath. Rescue 18 g 11    meclizine (ANTIVERT) 25 mg tablet Take 1 tablet (25 mg total) by mouth 3 (three) times daily as needed. 30 tablet 2    buPROPion (WELLBUTRIN SR) 150 MG TBSR 12 hr tablet Take 1 tablet (150 mg total) by mouth 2 (two) times daily. 60 tablet 11    HYDROcodone-acetaminophen (NORCO)  " mg per tablet Take 1 tablet by mouth every 4 (four) hours as needed. (Patient not taking: Reported on 3/8/2022) 18 tablet 0    ondansetron (ZOFRAN) 4 MG tablet Take 1 tablet (4 mg total) by mouth every 8 (eight) hours as needed for Nausea. (Patient not taking: Reported on 3/8/2022) 12 tablet 0    oxyCODONE-acetaminophen (PERCOCET) 5-325 mg per tablet Take 1 tablet by mouth every 6 (six) hours as needed for Pain. (Patient not taking: No sig reported) 12 tablet 0    umeclidinium-vilanteroL (ANORO ELLIPTA) 62.5-25 mcg/actuation DsDv Inhale 1 puff into the lungs once daily. Controller 1 each 5     No current facility-administered medications for this visit.       Past Medical History:   Diagnosis Date    COPD (chronic obstructive pulmonary disease)     Hyperlipidemia     Hyperthyroidism     radioactive thyroid ablation    Vertigo      Past Surgical History:   Procedure Laterality Date    APPENDECTOMY      CHOLECYSTECTOMY      HYSTERECTOMY      VITRECTOMY BY PARS PLANA APPROACH Right 7/8/2020    Procedure: VITRECTOMY, PARS PLANA APPROACH;  Surgeon: ZULLY Ortiz MD;  Location: [address];  Service: Ophthalmology;  Laterality: Right;  40 min     Family History   Problem Relation Age of Onset    Stroke Mother     Heart attack Mother     Heart disease Mother     Cancer Father         brain    Heart disease Brother      Social History     Tobacco Use    Smoking status: Current Every Day Smoker     Packs/day: 1.00     Types: Cigarettes     Start date: 1968    Smokeless tobacco: Never Used   Substance Use Topics    Alcohol use: No    Drug use: No          OBJECTIVE:     Vital Signs (Most Recent)  Vital Signs  Pulse: 70  Resp: 18  SpO2: 98 %  BP: 126/74  Pain Score: 0-No pain  Height and Weight  Height: 5' 3" (160 cm)  Weight: 77.1 kg (169 lb 15.6 oz)  BSA (Calculated - sq m): 1.85 sq meters  BMI (Calculated): 30.1  Weight in (lb) to have BMI = 25: 140.8]  Wt Readings from Last 2 " Encounters:   03/08/22 77.1 kg (169 lb 15.6 oz)   02/25/22 75.8 kg (167 lb)         Physical Exam:  Physical Exam   Constitutional: She is oriented to person, place, and time. She appears well-developed and well-nourished. No distress.   HENT:   Head: Normocephalic.   Cardiovascular: Normal rate, regular rhythm and normal heart sounds.   Pulmonary/Chest: Normal expansion and effort normal. No stridor. No respiratory distress. She has decreased breath sounds. She has wheezes. She exhibits no tenderness.   Abdominal: She exhibits no distension.   Musculoskeletal:         General: No tenderness.      Cervical back: Neck supple.   Lymphadenopathy:     She has no cervical adenopathy.   Neurological: She is alert and oriented to person, place, and time. Gait normal.   Skin: Skin is warm. Nails show no clubbing.   Psychiatric: She has a normal mood and affect. Her behavior is normal. Judgment and thought content normal.   Nursing note and vitals reviewed.      Laboratory  Lab Results   Component Value Date    WBC 9.69 02/02/2022    RBC 4.54 02/02/2022    HGB 12.9 02/02/2022    HCT 40.8 02/02/2022    MCV 90 02/02/2022    MCH 28.4 02/02/2022    MCHC 31.6 (L) 02/02/2022    RDW 12.7 02/02/2022     02/02/2022    MPV 10.1 02/02/2022    GRAN 5.6 02/02/2022    GRAN 57.7 02/02/2022    LYMPH 3.3 02/02/2022    LYMPH 33.5 02/02/2022    MONO 0.5 02/02/2022    MONO 4.9 02/02/2022    EOS 0.2 02/02/2022    BASO 0.12 02/02/2022    EOSINOPHIL 2.3 02/02/2022    BASOPHIL 1.2 02/02/2022       BMP  Lab Results   Component Value Date     02/02/2022    K 3.6 02/02/2022     02/02/2022    CO2 27 02/02/2022    BUN 12 02/02/2022    CREATININE 0.7 02/02/2022    CALCIUM 9.3 02/02/2022    ANIONGAP 9 02/02/2022    ESTGFRAFRICA >60 02/02/2022    EGFRNONAA >60 02/02/2022    AST 27 02/02/2022    ALT 32 02/02/2022    PROT 7.1 02/02/2022       Lab Results   Component Value Date    BNP 22 02/02/2022    BNP <10 06/27/2019       Lab Results    Component Value Date    TSH 3.508 07/11/2017       No results found for: SEDRATE    No results found for: CRP    No results found for: IGE    No results found for: ASPERGILLUS  No results found for: AFUMIGATUSCL     No results found for: ACE    Diagnostic Results:    I have personally reviewed today the following studies :    Chest x-ray February 2022 within normal    Low-dose screening CT chest 2017      Narrative & Impression  Technique: CT of the thorax was performed with low dose, lung screening protocol.  No contrast was administered.  Sagittal and coronal reconstructions were obtained.     Comparison: None.     Findings:     Lungs: There are several punctate noncalcified solid appearing pulmonary nodules in the left lung measuring up to 2.6 mm as seen in the left upper lobe on image 26, series 3 and left lower lobe on image 57, series 3.  Calcified granuloma also noted in the left lower lobe medially. The lungs show no findings consistent with emphysema.     Pleura: No effusion.     Mediastinum: No pathologic lymphadenopathy.  Several calcified left perihilar lymph nodes noted.     Heart and pericardium: Normal size without effusion.     Aorta and vasculature: Atherosclerosis including coronary arteries.     Chest wall and skeletal structures: Unremarkable except age-appropriate degenerative changes.     Upper abdomen: Unremarkable.  IMPRESSION:         Small noncalcified pulmonary nodules in the left lung measuring up to 2.6 mm.  1 year followup CT recommended.       Spirometry February 2022 moderately severe obstruction DLCO normal    6 minute walking test February 2022 mild reduction of exercise capacity no desaturation        ASSESSMENT/PLAN:     COPD, moderate  -     umeclidinium-vilanteroL (ANORO ELLIPTA) 62.5-25 mcg/actuation DsDv; Inhale 1 puff into the lungs once daily. Controller  Dispense: 1 each; Refill: 5    Need for 23-polyvalent pneumococcal polysaccharide vaccine  -     Pneumococcal  Polysaccharide Vaccine (23 Valent) (SQ/IM)    Smoking greater than 30 pack years  -     CT Chest Lung Screening Low Dose; Future; Expected date: 03/08/2022  -     buPROPion (WELLBUTRIN SR) 150 MG TBSR 12 hr tablet; Take 1 tablet (150 mg total) by mouth 2 (two) times daily.  Dispense: 60 tablet; Refill: 11    Tobacco dependence due to cigarettes  -     buPROPion (WELLBUTRIN SR) 150 MG TBSR 12 hr tablet; Take 1 tablet (150 mg total) by mouth 2 (two) times daily.  Dispense: 60 tablet; Refill: 11    Multiple lung nodules on CT  -     CT Chest Lung Screening Low Dose; Future; Expected date: 03/08/2022    Continue albuterol p.r.n.    Start Anoro 1 puff daily.    Counseled patient 10 minutes about smoking cessation, agreeable to start Wellbutrin 150 mg daily sustained release.      Provided patient with patient drug information to monitor for side effects on Wellbutrin.    Shared medical decision about annual low-dose screening CT chest agreeable to proceed with low-dose screening CT chest.    P PSV 23 today.  Prevnar 13 next visit.    Advised patient to obtain yearly flu shot and COVID-19 vaccine.    Further recommendation to follow above workup.        Follow up in about 3 months (around 6/8/2022).    This note was prepared using voice recognition system and is likely to have sound alike errors that may have been overlooked even after proof reading.  Please call me with any questions    Discussed diagnosis, its evaluation, treatment and usual course. All questions answered.    Thank you for the courtesy of participating in the care of this patient    Jimena Ta MD

## 2022-03-08 NOTE — TELEPHONE ENCOUNTER
Placed called and scheduled for 3/18/22 at the [facility] with Dr. choudhary----- Message from Mine Tomas sent at 3/8/2022 12:59 PM CST -----  Pt would like return call regarding appt. Please call back at [phone].  Cameronx Md

## 2022-03-09 ENCOUNTER — TELEPHONE (OUTPATIENT)
Dept: PULMONOLOGY | Facility: CLINIC | Age: 72
End: 2022-03-09
Payer: MEDICARE

## 2022-03-09 NOTE — TELEPHONE ENCOUNTER
Spoke to pt about medication sent to the pharmacy contacted pharmacy to confirm receipt and if ready for

## 2022-03-09 NOTE — TELEPHONE ENCOUNTER
----- Message from Elaina Villagran sent at 3/9/2022  9:02 AM CST -----  Regarding: pharmacy issue  Contact: pradeep Richards/Walker calling pt regarding medication the dr didn't say he was ordering and missing two presciptions.  Wellbutrin and inhaler. [phone]

## 2022-03-18 ENCOUNTER — OFFICE VISIT (OUTPATIENT)
Dept: CARDIOLOGY | Facility: CLINIC | Age: 72
End: 2022-03-18
Payer: MEDICARE

## 2022-03-18 ENCOUNTER — HOSPITAL ENCOUNTER (OUTPATIENT)
Dept: RADIOLOGY | Facility: HOSPITAL | Age: 72
Discharge: HOME | End: 2022-03-18
Attending: INTERNAL MEDICINE
Payer: MEDICARE

## 2022-03-18 VITALS
BODY MASS INDEX: 30.11 KG/M2 | DIASTOLIC BLOOD PRESSURE: 68 MMHG | SYSTOLIC BLOOD PRESSURE: 108 MMHG | WEIGHT: 170 LBS | OXYGEN SATURATION: 98 % | HEART RATE: 91 BPM

## 2022-03-18 DIAGNOSIS — J44.9 CHRONIC OBSTRUCTIVE PULMONARY DISEASE, UNSPECIFIED COPD TYPE: ICD-10-CM

## 2022-03-18 DIAGNOSIS — E78.5 HYPERLIPIDEMIA, UNSPECIFIED HYPERLIPIDEMIA TYPE: ICD-10-CM

## 2022-03-18 DIAGNOSIS — F17.210 SMOKING GREATER THAN 30 PACK YEARS: ICD-10-CM

## 2022-03-18 DIAGNOSIS — R06.02 SHORTNESS OF BREATH: Primary | ICD-10-CM

## 2022-03-18 DIAGNOSIS — R07.9 CHEST PAIN SYNDROME: ICD-10-CM

## 2022-03-18 DIAGNOSIS — J84.10 CALCIFIED GRANULOMA OF LUNG: Chronic | ICD-10-CM

## 2022-03-18 DIAGNOSIS — Z86.39 HISTORY OF THYROID DISEASE: ICD-10-CM

## 2022-03-18 DIAGNOSIS — R91.8 MULTIPLE LUNG NODULES ON CT: ICD-10-CM

## 2022-03-18 DIAGNOSIS — F17.200 TOBACCO USE DISORDER: ICD-10-CM

## 2022-03-18 DIAGNOSIS — Z72.0 NICOTINE ABUSE: ICD-10-CM

## 2022-03-18 DIAGNOSIS — R91.8 PULMONARY NODULES: ICD-10-CM

## 2022-03-18 PROCEDURE — 3008F BODY MASS INDEX DOCD: CPT | Mod: CPTII,S$GLB,, | Performed by: INTERNAL MEDICINE

## 2022-03-18 PROCEDURE — 71271 CT THORAX LUNG CANCER SCR C-: CPT | Mod: 26,,, | Performed by: RADIOLOGY

## 2022-03-18 PROCEDURE — 1159F MED LIST DOCD IN RCRD: CPT | Mod: CPTII,S$GLB,, | Performed by: INTERNAL MEDICINE

## 2022-03-18 PROCEDURE — 99214 OFFICE O/P EST MOD 30 MIN: CPT | Mod: S$GLB,,, | Performed by: INTERNAL MEDICINE

## 2022-03-18 PROCEDURE — 3008F PR BODY MASS INDEX (BMI) DOCUMENTED: ICD-10-PCS | Mod: CPTII,S$GLB,, | Performed by: INTERNAL MEDICINE

## 2022-03-18 PROCEDURE — 3288F PR FALLS RISK ASSESSMENT DOCUMENTED: ICD-10-PCS | Mod: CPTII,S$GLB,, | Performed by: INTERNAL MEDICINE

## 2022-03-18 PROCEDURE — 1101F PT FALLS ASSESS-DOCD LE1/YR: CPT | Mod: CPTII,S$GLB,, | Performed by: INTERNAL MEDICINE

## 2022-03-18 PROCEDURE — 71271 CT CHEST LUNG SCREENING LOW DOSE: ICD-10-PCS | Mod: 26,,, | Performed by: RADIOLOGY

## 2022-03-18 PROCEDURE — 3288F FALL RISK ASSESSMENT DOCD: CPT | Mod: CPTII,S$GLB,, | Performed by: INTERNAL MEDICINE

## 2022-03-18 PROCEDURE — 99499 RISK ADDL DX/OHS AUDIT: ICD-10-PCS | Mod: S$GLB,,, | Performed by: INTERNAL MEDICINE

## 2022-03-18 PROCEDURE — 71271 CT THORAX LUNG CANCER SCR C-: CPT | Mod: TC

## 2022-03-18 PROCEDURE — 3078F DIAST BP <80 MM HG: CPT | Mod: CPTII,S$GLB,, | Performed by: INTERNAL MEDICINE

## 2022-03-18 PROCEDURE — 99214 PR OFFICE/OUTPT VISIT, EST, LEVL IV, 30-39 MIN: ICD-10-PCS | Mod: S$GLB,,, | Performed by: INTERNAL MEDICINE

## 2022-03-18 PROCEDURE — 3074F SYST BP LT 130 MM HG: CPT | Mod: CPTII,S$GLB,, | Performed by: INTERNAL MEDICINE

## 2022-03-18 PROCEDURE — 99499 UNLISTED E&M SERVICE: CPT | Mod: S$GLB,,, | Performed by: INTERNAL MEDICINE

## 2022-03-18 PROCEDURE — 1101F PR PT FALLS ASSESS DOC 0-1 FALLS W/OUT INJ PAST YR: ICD-10-PCS | Mod: CPTII,S$GLB,, | Performed by: INTERNAL MEDICINE

## 2022-03-18 PROCEDURE — 99999 PR PBB SHADOW E&M-EST. PATIENT-LVL III: ICD-10-PCS | Mod: PBBFAC,,, | Performed by: INTERNAL MEDICINE

## 2022-03-18 PROCEDURE — 1160F RVW MEDS BY RX/DR IN RCRD: CPT | Mod: CPTII,S$GLB,, | Performed by: INTERNAL MEDICINE

## 2022-03-18 PROCEDURE — 1126F AMNT PAIN NOTED NONE PRSNT: CPT | Mod: CPTII,S$GLB,, | Performed by: INTERNAL MEDICINE

## 2022-03-18 PROCEDURE — 1160F PR REVIEW ALL MEDS BY PRESCRIBER/CLIN PHARMACIST DOCUMENTED: ICD-10-PCS | Mod: CPTII,S$GLB,, | Performed by: INTERNAL MEDICINE

## 2022-03-18 PROCEDURE — 1159F PR MEDICATION LIST DOCUMENTED IN MEDICAL RECORD: ICD-10-PCS | Mod: CPTII,S$GLB,, | Performed by: INTERNAL MEDICINE

## 2022-03-18 PROCEDURE — 3078F PR MOST RECENT DIASTOLIC BLOOD PRESSURE < 80 MM HG: ICD-10-PCS | Mod: CPTII,S$GLB,, | Performed by: INTERNAL MEDICINE

## 2022-03-18 PROCEDURE — 1126F PR PAIN SEVERITY QUANTIFIED, NO PAIN PRESENT: ICD-10-PCS | Mod: CPTII,S$GLB,, | Performed by: INTERNAL MEDICINE

## 2022-03-18 PROCEDURE — 3074F PR MOST RECENT SYSTOLIC BLOOD PRESSURE < 130 MM HG: ICD-10-PCS | Mod: CPTII,S$GLB,, | Performed by: INTERNAL MEDICINE

## 2022-03-18 PROCEDURE — 99999 PR PBB SHADOW E&M-EST. PATIENT-LVL III: CPT | Mod: PBBFAC,,, | Performed by: INTERNAL MEDICINE

## 2022-03-18 NOTE — PROGRESS NOTES
Subjective:   Patient ID:  Kia Smith is a 71 y.o. female who presents for follow up of No chief complaint on file.      HPI   2/8/2022  referred from DR KELLY.  A 70 yo female with tobacco use bronchitis is here for evaluation of shortness of breath fatigue decrease exercise tolerance associated with chest tightness. She continues to smoke . She has significant decrease in exercise tolerance that has been progressively worse.she has last been evaluated pulmonary wise was 5 years ago.  Has palpitations at nite she has 2 pillow orthopnea. Has vertigo,.no chf symptomatology.    3/18/2022   was evaluated by pulmonary has copd placed omn inhalers still smoking.palpitation unchanged. No chest pain. Had cardiac w/u done.   Past Medical History:   Diagnosis Date    COPD (chronic obstructive pulmonary disease)     Hyperlipidemia     Hyperthyroidism     radioactive thyroid ablation    Vertigo        Past Surgical History:   Procedure Laterality Date    APPENDECTOMY      CHOLECYSTECTOMY      HYSTERECTOMY      VITRECTOMY BY PARS PLANA APPROACH Right 7/8/2020    Procedure: VITRECTOMY, PARS PLANA APPROACH;  Surgeon: ZULLY Ortiz MD;  Location: [facility] OR [address] [place] [address];  Service: Ophthalmology;  Laterality: Right;  40 min       Social History     Tobacco Use    Smoking status: Current Every Day Smoker     Packs/day: 1.00     Types: Cigarettes     Start date: 1968    Smokeless tobacco: Never Used   Substance Use Topics    Alcohol use: No    Drug use: No       Family History   Problem Relation Age of Onset    Stroke Mother     Heart attack Mother     Heart disease Mother     Cancer Father         brain    Heart disease Brother        Current Outpatient Medications   Medication Sig    albuterol (VENTOLIN HFA) 90 mcg/actuation inhaler Inhale 2 puffs into the lungs every 4 (four) hours as needed for Wheezing or Shortness of Breath. Rescue    buPROPion (WELLBUTRIN SR) 150 MG TBSR 12 hr tablet Take 1  tablet (150 mg total) by mouth once daily.    HYDROcodone-acetaminophen (NORCO)  mg per tablet Take 1 tablet by mouth every 4 (four) hours as needed.    meclizine (ANTIVERT) 25 mg tablet Take 1 tablet (25 mg total) by mouth 3 (three) times daily as needed.    ondansetron (ZOFRAN) 4 MG tablet Take 1 tablet (4 mg total) by mouth every 8 (eight) hours as needed for Nausea.    umeclidinium-vilanteroL (ANORO ELLIPTA) 62.5-25 mcg/actuation DsDv Inhale 1 puff into the lungs once daily. Controller     No current facility-administered medications for this visit.     Current Outpatient Medications on File Prior to Visit   Medication Sig    albuterol (VENTOLIN HFA) 90 mcg/actuation inhaler Inhale 2 puffs into the lungs every 4 (four) hours as needed for Wheezing or Shortness of Breath. Rescue    buPROPion (WELLBUTRIN SR) 150 MG TBSR 12 hr tablet Take 1 tablet (150 mg total) by mouth once daily.    HYDROcodone-acetaminophen (NORCO)  mg per tablet Take 1 tablet by mouth every 4 (four) hours as needed.    meclizine (ANTIVERT) 25 mg tablet Take 1 tablet (25 mg total) by mouth 3 (three) times daily as needed.    ondansetron (ZOFRAN) 4 MG tablet Take 1 tablet (4 mg total) by mouth every 8 (eight) hours as needed for Nausea.    umeclidinium-vilanteroL (ANORO ELLIPTA) 62.5-25 mcg/actuation DsDv Inhale 1 puff into the lungs once daily. Controller     No current facility-administered medications on file prior to visit.     Review of patient's allergies indicates:   Allergen Reactions    Demerol [meperidine]     Pcn [penicillins]        Review of Systems   Constitutional: Negative for diaphoresis, malaise/fatigue and weight gain.   HENT: Negative for hoarse voice.    Eyes: Negative for double vision and visual disturbance.   Cardiovascular: Positive for dyspnea on exertion. Negative for chest pain, claudication, cyanosis, irregular heartbeat, leg swelling, near-syncope, orthopnea, palpitations, paroxysmal  nocturnal dyspnea and syncope.   Respiratory: Positive for cough and shortness of breath. Negative for hemoptysis and snoring.    Hematologic/Lymphatic: Negative for bleeding problem. Does not bruise/bleed easily.   Skin: Negative for color change and poor wound healing.   Musculoskeletal: Negative for muscle cramps, muscle weakness and myalgias.   Gastrointestinal: Negative for bloating, abdominal pain, change in bowel habit, diarrhea, heartburn, hematemesis, hematochezia, melena and nausea.   Neurological: Negative for excessive daytime sleepiness, dizziness, headaches, light-headedness, loss of balance, numbness and weakness.   Psychiatric/Behavioral: Negative for memory loss. The patient does not have insomnia.    Allergic/Immunologic: Negative for hives.       Objective:   Physical Exam  Vitals:    03/18/22 1544 03/18/22 1545   BP: 114/72 108/68   BP Location: Right arm Left arm   Patient Position: Sitting Sitting   Pulse: 91 91   SpO2: 98%    Weight: 77.1 kg (169 lb 15.6 oz)      Lab Results   Component Value Date    CHOL 221 (H) 07/11/2017     Lab Results   Component Value Date    HDL 32 (L) 07/11/2017     Lab Results   Component Value Date    LDLCALC 118.2 07/11/2017     Lab Results   Component Value Date    TRIG 354 (H) 07/11/2017     Lab Results   Component Value Date    CHOLHDL 14.5 (L) 07/11/2017       Chemistry        Component Value Date/Time     02/02/2022 1340    K 3.6 02/02/2022 1340     02/02/2022 1340    CO2 27 02/02/2022 1340    BUN 12 02/02/2022 1340    CREATININE 0.7 02/02/2022 1340     02/02/2022 1340        Component Value Date/Time    CALCIUM 9.3 02/02/2022 1340    ALKPHOS 87 02/02/2022 1340    AST 27 02/02/2022 1340    ALT 32 02/02/2022 1340    BILITOT 0.3 02/02/2022 1340    ESTGFRAFRICA >60 02/02/2022 1340    EGFRNONAA >60 02/02/2022 1340          Lab Results   Component Value Date    TSH 3.508 07/11/2017     Lab Results   Component Value Date    INR 1.0 07/10/2017      Lab Results   Component Value Date    WBC 9.69 02/02/2022    HGB 12.9 02/02/2022    HCT 40.8 02/02/2022    MCV 90 02/02/2022     02/02/2022     BMP  Sodium   Date Value Ref Range Status   02/02/2022 141 136 - 145 mmol/L Final     Potassium   Date Value Ref Range Status   02/02/2022 3.6 3.5 - 5.1 mmol/L Final     Chloride   Date Value Ref Range Status   02/02/2022 105 95 - 110 mmol/L Final     CO2   Date Value Ref Range Status   02/02/2022 27 23 - 29 mmol/L Final     BUN   Date Value Ref Range Status   02/02/2022 12 8 - 23 mg/dL Final     Creatinine   Date Value Ref Range Status   02/02/2022 0.7 0.5 - 1.4 mg/dL Final     Calcium   Date Value Ref Range Status   02/02/2022 9.3 8.7 - 10.5 mg/dL Final     Anion Gap   Date Value Ref Range Status   02/02/2022 9 8 - 16 mmol/L Final     eGFR if    Date Value Ref Range Status   02/02/2022 >60 >60 mL/min/1.73 m^2 Final     eGFR if non    Date Value Ref Range Status   02/02/2022 >60 >60 mL/min/1.73 m^2 Final     Comment:     Calculation used to obtain the estimated glomerular filtration  rate (eGFR) is the CKD-EPI equation.        CrCl cannot be calculated (Patient's most recent lab result is older than the maximum 7 days allowed.).  Conclusion         Normal myocardial perfusion scan. There is no evidence of myocardial ischemia or infarction.    The gated perfusion images showed an ejection fraction of 90% at rest. The gated perfusion images showed an ejection fraction of 74% post stress.    The EKG portion of this study is negative for ischemia.    The patient reported no chest pain during the stress test.    There were no arrhythmias during stress.     Summary    · The estimated PA systolic pressure is 33 mmHg.  · The left ventricle is normal in size with normal systolic function.  · Normal left ventricular diastolic function.  · Normal right ventricular size with normal right ventricular systolic function.  · Normal central  venous pressure (3 mmHg).  · The estimated ejection fraction is 60%.  · Mild tricuspid regurgitation.    Conclusion       · Predominant Rhythm Sinus rhythm with heart rates varying between 55 and 133 BPM with an average of 86 BPM.     Monitoring started at 12:13 PM and continued for 47 hr 59 min. The average heart rate was 86 BPM. The minimum heart  rate was 55 BPM, occurring at 7:49:42 AM D1. The maximum heart rate was 133 BPM, occurring at 12:04:09 PM D1.     Ventricular ectopic activity consisted of 2495 beats, of which, 2 were in couplets, 2365 were in single PVCs, 1 was in  interpolated PVCs, 125 were single VEs, 2 were in late.     The patient's rhythm included 29 sec of bradycardia. The slowest single episode of bradycardia occurred at 7:49:37 AM  D1, lasting 9 sec, with minimum heart rate of 55 BPM.     The patient's rhythm included 4 hr 12 min 4 sec of tachycardia. The fastest single episode of tachycardia occurred at  12:04:05 PM D1, lasting 19 sec, with maximum heart rate of 133 BPM.     Supraventricular ectopic activity consisted of 12 beats, of which, 12 were single PACs. The longest R-R interval was 1.2  seconds occurring at 7:49:42 AM D1. The longest N-N interval was 1.2 seconds occurring at 7:49:42 AM D1.    Assessment:     1. Shortness of breath    2. Calcified granuloma of lung    3. Chronic obstructive pulmonary disease, unspecified COPD type    4. Pulmonary nodules    5. Hyperlipidemia, unspecified hyperlipidemia type    6. History of thyroid disease    7. Chest pain syndrome    8. Nicotine abuse    9. Tobacco use disorder      Cardiac w/u  Negative for ischemia has minor coronary calcification. Her lvf is normal .has atherosclerosis and calcification in aorta will benefit from smoking cessation and asa ec 81 mg po daily as well needs repeat lipids to decide on statins therapy dose.   Her copd therapy is contributing to her ectopy no need for any further therapy so far except smoking cessation  address lung issues. /copd.  Reviewed pulmonary w/u.  Plan:   As per above   Asa ec 81 mg po daily.   get labs    stop smoking    monitor heart rate stop stimulant   Walking exercise program.  Continue current therapy  Cardiac low salt diet.  Risk factor modification and excercise program.  Smoking cessation counseling  F/u in 6 months

## 2022-03-19 ENCOUNTER — LAB VISIT (OUTPATIENT)
Dept: LAB | Facility: HOSPITAL | Age: 72
End: 2022-03-19
Attending: INTERNAL MEDICINE
Payer: MEDICARE

## 2022-03-19 DIAGNOSIS — E78.5 HYPERLIPIDEMIA, UNSPECIFIED HYPERLIPIDEMIA TYPE: ICD-10-CM

## 2022-03-19 DIAGNOSIS — Z86.39 HISTORY OF THYROID DISEASE: ICD-10-CM

## 2022-03-19 LAB
CHOLEST SERPL-MCNC: 237 MG/DL (ref 120–199)
CHOLEST/HDLC SERPL: 5.5 {RATIO} (ref 2–5)
HDLC SERPL-MCNC: 43 MG/DL (ref 40–75)
HDLC SERPL: 18.1 % (ref 20–50)
LDLC SERPL CALC-MCNC: 158.4 MG/DL (ref 63–159)
NONHDLC SERPL-MCNC: 194 MG/DL
T4 FREE SERPL-MCNC: 0.93 NG/DL (ref 0.71–1.51)
TRIGL SERPL-MCNC: 178 MG/DL (ref 30–150)
TSH SERPL DL<=0.005 MIU/L-ACNC: 4.25 UIU/ML (ref 0.4–4)

## 2022-03-19 PROCEDURE — 36415 COLL VENOUS BLD VENIPUNCTURE: CPT | Performed by: FAMILY MEDICINE

## 2022-03-19 PROCEDURE — 84443 ASSAY THYROID STIM HORMONE: CPT | Performed by: FAMILY MEDICINE

## 2022-03-19 PROCEDURE — 84439 ASSAY OF FREE THYROXINE: CPT | Performed by: FAMILY MEDICINE

## 2022-03-19 PROCEDURE — 80061 LIPID PANEL: CPT | Performed by: FAMILY MEDICINE

## 2022-03-21 ENCOUNTER — PATIENT MESSAGE (OUTPATIENT)
Dept: ADMINISTRATIVE | Facility: HOSPITAL | Age: 72
End: 2022-03-21
Payer: MEDICARE

## 2022-03-24 ENCOUNTER — TELEPHONE (OUTPATIENT)
Dept: PULMONOLOGY | Facility: CLINIC | Age: 72
End: 2022-03-24
Payer: MEDICARE

## 2022-03-24 DIAGNOSIS — F17.210 SMOKING GREATER THAN 30 PACK YEARS: Primary | ICD-10-CM

## 2022-03-24 NOTE — TELEPHONE ENCOUNTER
PHONED PT X2 ATTEMPTS. LVM.   Dr. Ta is wanting us to schedule a ct scan a year out. Please call us back at [phone] to get that scheduled.

## 2022-03-25 ENCOUNTER — PATIENT MESSAGE (OUTPATIENT)
Dept: PULMONOLOGY | Facility: CLINIC | Age: 72
End: 2022-03-25
Payer: MEDICARE

## 2022-04-06 ENCOUNTER — TELEPHONE (OUTPATIENT)
Dept: INTERNAL MEDICINE | Facility: CLINIC | Age: 72
End: 2022-04-06
Payer: MEDICARE

## 2022-04-06 ENCOUNTER — TELEPHONE (OUTPATIENT)
Dept: ADMINISTRATIVE | Facility: HOSPITAL | Age: 72
End: 2022-04-06
Payer: MEDICARE

## 2022-04-06 NOTE — TELEPHONE ENCOUNTER
Spoke to pt about ehr lab results.  Pt verbalized that she knew about lab results.  Pt is frustrated because Dr. Julianne Martinez said that he needed her results stat to prescribe a statin type medication.  Pt states it has been three weeks and no one from Dr. Martinez's office has contacted ehr about medication, results, nor returned her calls and messages.  Advised pt that I would pass the information to PCP provider.

## 2022-04-07 NOTE — TELEPHONE ENCOUNTER
"I don't understand, they were not overlooked. I reviewed the labs and sent my comments regarding them 3/22/22 in mychart to the patient (see below).    "Triglycerides have improved, but are still a little above goal. Continue working on lifestyle changes.  TSH mildly elevated with T4 in normal range.  Please let me know if you have any questions.  Lilia Mcmullen M.D."  "

## 2022-04-07 NOTE — TELEPHONE ENCOUNTER
Spoke to provider.  We verified pt had received lab results.  Pt verbalized she had been given lab results.  Pt expressed frustration because she has been reaching out to Dr. Martinez staff and has not received reciprocal communication.  Please reach out to pt and touch base with her.  I believe she has questions about a Rx that Dr. Martinez said he wanted to prescribe her.

## 2022-04-07 NOTE — TELEPHONE ENCOUNTER
Message from Dr. Martinez:    Hello. When we received these results, Dr. Martniez asked me to forward to Dr. Mcmullen staff to address because they came to him and Dr. Mcmullen was the ordering provider. She did see Dr. Martinez that day but I was not aware that anything was said about there being an urgency with her lab results. I forward the results to the staff box and asked you all you address the labs with the patient and Dr. Mcmullen. I'm not sure if you all did not receive them or someone overlooked them.

## 2022-04-07 NOTE — TELEPHONE ENCOUNTER
"This is the message I attached to her results in MyChart: "Triglycerides have improved, but are still a little above goal. Continue working on lifestyle changes.  TSH mildly elevated with T4 in normal range.  Please let me know if you have any questions.  Lilia Mcmullen M.D."    I will route her message to Dr. Martinez as well.  "

## 2022-04-08 ENCOUNTER — TELEPHONE (OUTPATIENT)
Dept: CARDIOLOGY | Facility: CLINIC | Age: 72
End: 2022-04-08
Payer: MEDICARE

## 2022-04-08 DIAGNOSIS — E78.5 HYPERLIPIDEMIA, UNSPECIFIED HYPERLIPIDEMIA TYPE: Primary | ICD-10-CM

## 2022-04-08 NOTE — TELEPHONE ENCOUNTER
"Spoke to patient and informed her that Dr. Cao would like her to be compliant for the next 3 months and repeat her labs then  before deciding on medication. Labs scheduled for 7/9/22 at the [facility]. Patient verbalized an understanding.         18 hours ago (5:38 PM)         Not yet.needs lipid check in 3 months after compliance    Message text       Kj Martinez MD 22 hours ago (2:08 PM)     NEREIDA    What statin do you want her on?    Message text       Socorro Gallardo LPN routed conversation to You; Dafne Arrieta LPN Yesterday (11:53 AM)     Dafne Arrieta LPN routed conversation to Juan FERREIRA Staff Yesterday (11:44 AM)     Dafne Arrieta LPN Yesterday (11:44 AM)     AG       Spoke to provider.  We verified pt had received lab results.  Pt verbalized she had been given lab results.  Pt expressed frustration because she has been reaching out to Dr. Martinez staff and has not received reciprocal communication.  Please reach out to pt and touch base with her.  I believe she has questions about a Rx that Dr. Martinez said he wanted to prescribe her.         Documentation      Lilia Mcmullen MD routed conversation to Benedict FARFAN Staff Yesterday (10:58 AM)     Lilia Mcmullen MD Yesterday (10:58 AM)            I don't understand, they were not overlooked. I reviewed the labs and sent my comments regarding them 3/22/22 in [facility] to the patient (see below).     "Triglycerides have improved, but are still a little above goal. Continue working on lifestyle changes.  TSH mildly elevated with T4 in normal range.  Please let me know if you have any questions.  Lilia Mcmullen M.D."         Documentation      Dafne Arrieta LPN routed conversation to Lilia Mcmullen MD Yesterday (9:26 AM)     Dafne Arrieta LPN Yesterday (9:26 AM)     AG       Message from Dr. Martinez:     Hello. When we received these results, Dr. Martinez asked me to forward to Dr. Mcmullen staff to address because they came to him and Dr. Mcmullen " "was the ordering provider. She did see Dr. Martinez that day but I was not aware that anything was said about there being an urgency with her lab results. I forward the results to the staff box and asked you all you address the labs with the patient and Dr. Mcmullen. I'm not sure if you all did not receive them or someone overlooked them.             Documentation      You  Lilia Mcmullen MD; Benedict FARFAN Staff; Julianne Martinez MD Yesterday (9:17 AM)     [facility]. When we received these results, Dr. Martinez asked me to forward to Dr. Mcmullen staff to address because they came to him and Dr. Mcmullen was the ordering provider. She did see Dr. Martinez that day but I was not aware that anything was said about there being an urgency with her lab results. I forward the results to the staff box and asked you all you address the labs with the patient and Dr. Mcmullen. I'm not sure if you all did not receive them or someone overlooked them.     Message text       MD Lilia Lopez MD; Juan FERREIRA Staff Yesterday (9:12 AM)         Her cholesterol has increased compared to before I think she gets an attemptt at good lifestyle modification diet smoking if becca improvemnet I think she needs statins.     Message text       Lilia Mcmullen MD routed conversation to Benedict FARFAN Staff; Juan FERREIRA Staff; Julianne Martinez MD Yesterday (8:39 AM)     Lilia Mcmullen MD Yesterday (8:38 AM)            [facility] "Triglycerides have improved, but are still a little above goal. Continue working on lifestyle changes.  TSH mildly elevated with T4 in normal range.  Please let me know if you have any questions.  Lilia Mcmullen M.D."     I will route her message to Dr. Martinez as well.         Documentation      Dafne Arrieta LPN routed conversation to Lilia Mcmullen MD 2 days ago     Dafne Arrieta LPN 2 days ago     AG       Spoke to pt about ehr lab " results.  Pt verbalized that she knew about lab results.  Pt is frustrated because Dr. Julianne Martinez said that he needed her results stat to prescribe a statin type medication.  Pt states it has been three weeks and no one from Dr. Martinez's office has contacted ehr about medication, results, nor returned her calls and messages.  Advised pt that I would pass the information to PCP provider.

## 2022-04-27 ENCOUNTER — PATIENT MESSAGE (OUTPATIENT)
Dept: SMOKING CESSATION | Facility: CLINIC | Age: 72
End: 2022-04-27
Payer: MEDICARE

## 2022-05-03 ENCOUNTER — TELEPHONE (OUTPATIENT)
Dept: ADMINISTRATIVE | Facility: HOSPITAL | Age: 72
End: 2022-05-03
Payer: MEDICARE

## 2022-06-07 ENCOUNTER — PES CALL (OUTPATIENT)
Dept: ADMINISTRATIVE | Facility: CLINIC | Age: 72
End: 2022-06-07
Payer: MEDICARE

## 2022-06-21 ENCOUNTER — PES CALL (OUTPATIENT)
Dept: ADMINISTRATIVE | Facility: CLINIC | Age: 72
End: 2022-06-21
Payer: MEDICARE

## 2022-06-29 DIAGNOSIS — Z12.31 OTHER SCREENING MAMMOGRAM: ICD-10-CM

## 2022-08-24 DIAGNOSIS — Z78.0 MENOPAUSE: ICD-10-CM

## 2022-09-20 ENCOUNTER — TELEPHONE (OUTPATIENT)
Dept: ADMINISTRATIVE | Facility: HOSPITAL | Age: 72
End: 2022-09-20
Payer: MEDICARE

## 2022-10-05 ENCOUNTER — TELEPHONE (OUTPATIENT)
Dept: ADMINISTRATIVE | Facility: HOSPITAL | Age: 72
End: 2022-10-05
Payer: MEDICARE

## 2022-10-12 ENCOUNTER — TELEPHONE (OUTPATIENT)
Dept: ADMINISTRATIVE | Facility: HOSPITAL | Age: 72
End: 2022-10-12
Payer: MEDICARE

## 2022-10-17 ENCOUNTER — TELEPHONE (OUTPATIENT)
Dept: ADMINISTRATIVE | Facility: HOSPITAL | Age: 72
End: 2022-10-17
Payer: MEDICARE

## 2022-11-02 ENCOUNTER — TELEPHONE (OUTPATIENT)
Dept: ADMINISTRATIVE | Facility: HOSPITAL | Age: 72
End: 2022-11-02
Payer: MEDICARE

## 2023-01-25 ENCOUNTER — PATIENT MESSAGE (OUTPATIENT)
Dept: ADMINISTRATIVE | Facility: HOSPITAL | Age: 73
End: 2023-01-25
Payer: MEDICARE

## 2023-03-27 ENCOUNTER — PES CALL (OUTPATIENT)
Dept: ADMINISTRATIVE | Facility: CLINIC | Age: 73
End: 2023-03-27
Payer: MEDICARE

## 2023-07-07 ENCOUNTER — PATIENT MESSAGE (OUTPATIENT)
Dept: INFECTIOUS DISEASES | Facility: CLINIC | Age: 73
End: 2023-07-07
Payer: MEDICARE

## 2023-12-07 ENCOUNTER — PATIENT MESSAGE (OUTPATIENT)
Dept: ADMINISTRATIVE | Facility: CLINIC | Age: 73
End: 2023-12-07
Payer: MEDICARE

## 2024-01-11 DIAGNOSIS — Z00.00 ENCOUNTER FOR MEDICARE ANNUAL WELLNESS EXAM: ICD-10-CM

## 2024-01-16 ENCOUNTER — PATIENT OUTREACH (OUTPATIENT)
Dept: ADMINISTRATIVE | Facility: HOSPITAL | Age: 74
End: 2024-01-16
Payer: COMMERCIAL

## 2024-01-31 DIAGNOSIS — Z78.0 MENOPAUSE: ICD-10-CM

## 2024-02-16 ENCOUNTER — PATIENT OUTREACH (OUTPATIENT)
Dept: ADMINISTRATIVE | Facility: HOSPITAL | Age: 74
End: 2024-02-16
Payer: COMMERCIAL

## 2024-02-16 NOTE — PROGRESS NOTES
Working not seen in 12 months.  Pt last seen Feb 2022.    LM offering to schedule annual exam.

## 2024-02-29 DIAGNOSIS — J44.9 COPD, MODERATE: ICD-10-CM

## 2024-03-01 RX ORDER — UMECLIDINIUM BROMIDE AND VILANTEROL TRIFENATATE 62.5; 25 UG/1; UG/1
1 POWDER RESPIRATORY (INHALATION) DAILY
Qty: 60 EACH | Refills: 5 | Status: SHIPPED | OUTPATIENT
Start: 2024-03-01 | End: 2024-06-17 | Stop reason: SDUPTHER

## 2024-04-03 ENCOUNTER — PATIENT MESSAGE (OUTPATIENT)
Dept: PULMONOLOGY | Facility: CLINIC | Age: 74
End: 2024-04-03
Payer: COMMERCIAL

## 2024-06-14 ENCOUNTER — PATIENT OUTREACH (OUTPATIENT)
Dept: ADMINISTRATIVE | Facility: HOSPITAL | Age: 74
End: 2024-06-14
Payer: MEDICARE

## 2024-06-14 NOTE — PROGRESS NOTES
VBHM Score: 3     Colon Cancer Screening  Osteoporosis Screening  Mammogram    Pneumonia Vaccine  Tetanus Vaccine  Shingles/Zoster Vaccine  RSV Vaccine        Additional Notes:  Attempted to contact the patient to discuss/schedule overdue annual exam with PCP and overdue HM screenings, no answer, left voicemail.               Care Management, Digital Medicine, and/or Education Referrals    OPCM Risk Score: 22.7         Next Steps - Referral Actions: no referrals

## 2024-06-14 NOTE — PROGRESS NOTES
Patient returned call, offered to schedule appointments, patient declined, will call back to schedule.

## 2024-06-17 ENCOUNTER — OFFICE VISIT (OUTPATIENT)
Dept: CARDIOLOGY | Facility: CLINIC | Age: 74
End: 2024-06-17
Payer: MEDICARE

## 2024-06-17 VITALS — DIASTOLIC BLOOD PRESSURE: 68 MMHG | SYSTOLIC BLOOD PRESSURE: 138 MMHG

## 2024-06-17 DIAGNOSIS — I70.0 ATHEROSCLEROSIS OF AORTA: Primary | ICD-10-CM

## 2024-06-17 DIAGNOSIS — J44.9 CHRONIC OBSTRUCTIVE PULMONARY DISEASE, UNSPECIFIED COPD TYPE: ICD-10-CM

## 2024-06-17 DIAGNOSIS — J44.9 COPD, MODERATE: ICD-10-CM

## 2024-06-17 DIAGNOSIS — F17.200 SMOKER: ICD-10-CM

## 2024-06-17 DIAGNOSIS — J44.9 STAGE 1 MILD COPD BY GOLD CLASSIFICATION: ICD-10-CM

## 2024-06-17 DIAGNOSIS — E78.49 OTHER HYPERLIPIDEMIA: ICD-10-CM

## 2024-06-17 PROCEDURE — 99214 OFFICE O/P EST MOD 30 MIN: CPT | Mod: S$GLB,,, | Performed by: INTERNAL MEDICINE

## 2024-06-17 PROCEDURE — 99999 PR PBB SHADOW E&M-EST. PATIENT-LVL II: CPT | Mod: PBBFAC,,, | Performed by: INTERNAL MEDICINE

## 2024-06-17 PROCEDURE — 3078F DIAST BP <80 MM HG: CPT | Mod: CPTII,S$GLB,, | Performed by: INTERNAL MEDICINE

## 2024-06-17 PROCEDURE — 3075F SYST BP GE 130 - 139MM HG: CPT | Mod: CPTII,S$GLB,, | Performed by: INTERNAL MEDICINE

## 2024-06-17 RX ORDER — UMECLIDINIUM BROMIDE AND VILANTEROL TRIFENATATE 62.5; 25 UG/1; UG/1
1 POWDER RESPIRATORY (INHALATION) DAILY
Qty: 60 EACH | Refills: 11 | Status: SHIPPED | OUTPATIENT
Start: 2024-06-17

## 2024-06-17 RX ORDER — ALBUTEROL SULFATE 90 UG/1
2 AEROSOL, METERED RESPIRATORY (INHALATION) EVERY 4 HOURS PRN
Qty: 18 G | Refills: 11 | Status: SHIPPED | OUTPATIENT
Start: 2024-06-17 | End: 2025-06-17

## 2024-06-17 RX ORDER — ATORVASTATIN CALCIUM 20 MG/1
20 TABLET, FILM COATED ORAL NIGHTLY
Qty: 30 TABLET | Refills: 11 | Status: SHIPPED | OUTPATIENT
Start: 2024-06-17 | End: 2025-06-17

## 2024-06-17 NOTE — PROGRESS NOTES
Subjective:   Patient ID:  Kia Smith is a 74 y.o. female who presents for evaluation of No chief complaint on file.      HPI  54-year-old female, COPD, hyperlipidemia, aortic atherosclerosis, history of polio with weakness to left leg.    Denies any chest pain.  Still smokes.  Has COPD.    Had a normal nuclear stress test 2 years ago.    Here for medication refill.    Accompanied by her daughter.      Past Medical History:   Diagnosis Date    COPD (chronic obstructive pulmonary disease)     Hyperlipidemia     Hyperthyroidism     radioactive thyroid ablation    Vertigo        Past Surgical History:   Procedure Laterality Date    APPENDECTOMY      CHOLECYSTECTOMY      HYSTERECTOMY      VITRECTOMY BY PARS PLANA APPROACH Right 7/8/2020    Procedure: VITRECTOMY, PARS PLANA APPROACH;  Surgeon: ZULLY Ortiz MD;  Location: [facility] OR [address] [place] [address];  Service: Ophthalmology;  Laterality: Right;  40 min       Social History     Tobacco Use    Smoking status: Every Day     Current packs/day: 1.00     Average packs/day: 1 pack/day for 56.5 years (56.5 ttl pk-yrs)     Types: Cigarettes     Start date: 1968    Smokeless tobacco: Never   Substance Use Topics    Alcohol use: No    Drug use: No       Family History   Problem Relation Name Age of Onset    Stroke Mother      Heart attack Mother      Heart disease Mother      Cancer Father          brain    Heart disease Brother         Review of Systems   Cardiovascular:  Positive for dyspnea on exertion. Negative for chest pain, palpitations and syncope.   Genitourinary: Negative.    Neurological: Negative.        Current Outpatient Medications on File Prior to Visit   Medication Sig    buPROPion (WELLBUTRIN SR) 150 MG TBSR 12 hr tablet Take 1 tablet (150 mg total) by mouth once daily.    HYDROcodone-acetaminophen (NORCO)  mg per tablet Take 1 tablet by mouth every 4 (four) hours as needed.    meclizine (ANTIVERT) 25 mg tablet Take 1 tablet (25 mg total) by  mouth 3 (three) times daily as needed.    ondansetron (ZOFRAN) 4 MG tablet Take 1 tablet (4 mg total) by mouth every 8 (eight) hours as needed for Nausea.    [DISCONTINUED] albuterol (VENTOLIN HFA) 90 mcg/actuation inhaler Inhale 2 puffs into the lungs every 4 (four) hours as needed for Wheezing or Shortness of Breath. Rescue    [DISCONTINUED] umeclidinium-vilanteroL (ANORO ELLIPTA) 62.5-25 mcg/actuation DsDv Inhale 1 puff into the lungs once daily. Controller     No current facility-administered medications on file prior to visit.       Objective:   Objective:  Wt Readings from Last 3 Encounters:   03/18/22 77.1 kg (169 lb 15.6 oz)   03/08/22 77.1 kg (169 lb 15.6 oz)   02/25/22 75.8 kg (167 lb)     Temp Readings from Last 3 Encounters:   02/08/22 98.1 °F (36.7 °C) (Tympanic)   02/02/22 98.7 °F (37.1 °C)   07/08/20 97.3 °F (36.3 °C) (Temporal)     BP Readings from Last 3 Encounters:   06/17/24 138/68   03/18/22 108/68   03/08/22 126/74     Pulse Readings from Last 3 Encounters:   03/18/22 91   03/08/22 70   02/08/22 83       Physical Exam  Vitals reviewed.   Constitutional:       Appearance: She is well-developed.   Neck:      Vascular: No carotid bruit.   Cardiovascular:      Rate and Rhythm: Normal rate and regular rhythm.      Pulses: Intact distal pulses.      Heart sounds: Normal heart sounds. No murmur heard.  Pulmonary:      Breath sounds: Wheezing present.   Neurological:      Mental Status: She is oriented to person, place, and time.         Lab Results   Component Value Date    CHOL 237 (H) 03/19/2022    CHOL 221 (H) 07/11/2017     Lab Results   Component Value Date    HDL 43 03/19/2022    HDL 32 (L) 07/11/2017     Lab Results   Component Value Date    LDLCALC 158.4 03/19/2022    LDLCALC 118.2 07/11/2017     Lab Results   Component Value Date    TRIG 178 (H) 03/19/2022    TRIG 354 (H) 07/11/2017     Lab Results   Component Value Date    CHOLHDL 18.1 (L) 03/19/2022    CHOLHDL 14.5 (L) 07/11/2017        Chemistry        Component Value Date/Time     02/02/2022 1340    K 3.6 02/02/2022 1340     02/02/2022 1340    CO2 27 02/02/2022 1340    BUN 12 02/02/2022 1340    CREATININE 0.7 02/02/2022 1340     02/02/2022 1340        Component Value Date/Time    CALCIUM 9.3 02/02/2022 1340    ALKPHOS 87 02/02/2022 1340    AST 27 02/02/2022 1340    ALT 32 02/02/2022 1340    BILITOT 0.3 02/02/2022 1340    ESTGFRAFRICA >60 02/02/2022 1340    EGFRNONAA >60 02/02/2022 1340          Lab Results   Component Value Date    TSH 4.252 (H) 03/19/2022     Lab Results   Component Value Date    INR 1.0 07/10/2017     Lab Results   Component Value Date    WBC 9.69 02/02/2022    HGB 12.9 02/02/2022    HCT 40.8 02/02/2022    MCV 90 02/02/2022     02/02/2022     BNP  @LABRCNTIP(BNP,BNPTRIAGEBLO)@  CrCl cannot be calculated (Patient's most recent lab result is older than the maximum 7 days allowed.).     Imaging:  ======    No results found for this or any previous visit.    No results found for this or any previous visit.    Results for orders placed during the hospital encounter of 11/06/18    X-Ray Chest PA And Lateral    Narrative  EXAMINATION:  XR CHEST PA AND LATERAL    CLINICAL HISTORY:  cough;    COMPARISON:  None    FINDINGS:  The size and contour of the heart are normal. The lungs are clear. There is no pneumothorax or pleural effusion.  There are minimal degenerative changes in the thoracic spine.  There are surgical clips projected over the abdomen.  This is seen on the lateral view.    IMPRESSION:    1. The lungs are clear.  2. There are minimal degenerative changes in the thoracic spine.  3. There are surgical clips projected over the abdomen. This is seen on the lateral view.  .      Electronically signed by: Jayden Hall MD  Date:    11/06/2018  Time:    14:21    No results found for this or any previous visit.    No valid procedures specified.    No results found for this or any previous  visit.      Results for orders placed during the hospital encounter of 02/25/22    Nuclear Stress - Cardiology Interpreted    Interpretation Summary    Normal myocardial perfusion scan. There is no evidence of myocardial ischemia or infarction.    The gated perfusion images showed an ejection fraction of 90% at rest. The gated perfusion images showed an ejection fraction of 74% post stress.    The EKG portion of this study is negative for ischemia.    The patient reported no chest pain during the stress test.    There were no arrhythmias during stress.      Results for orders placed during the hospital encounter of 02/25/22    Echo    Interpretation Summary  · The estimated PA systolic pressure is 33 mmHg.  · The left ventricle is normal in size with normal systolic function.  · Normal left ventricular diastolic function.  · Normal right ventricular size with normal right ventricular systolic function.  · Normal central venous pressure (3 mmHg).  · The estimated ejection fraction is 60%.  · Mild tricuspid regurgitation.      Diagnostic Results:  ECG: Reviewed    The 10-year ASCVD risk score (Jn FRANK, et al., 2019) is: 22.9%    Values used to calculate the score:      Age: 74 years      Sex: Female      Is Non- : No      Diabetic: No      Tobacco smoker: Yes      Systolic Blood Pressure: 138 mmHg      Is BP treated: No      HDL Cholesterol: 43 mg/dL      Total Cholesterol: 237 mg/dL        Assessment and Plan:   Atherosclerosis of aorta  -     atorvastatin (LIPITOR) 20 MG tablet; Take 1 tablet (20 mg total) by mouth every evening.  Dispense: 30 tablet; Refill: 11    COPD, moderate  -     umeclidinium-vilanteroL (ANORO ELLIPTA) 62.5-25 mcg/actuation DsDv; Inhale 1 puff into the lungs once daily. Controller  Dispense: 60 each; Refill: 11    Stage 1 mild COPD by GOLD classification  -     albuterol (VENTOLIN HFA) 90 mcg/actuation inhaler; Inhale 2 puffs into the lungs every 4 (four) hours as  needed for Wheezing or Shortness of Breath. Rescue  Dispense: 18 g; Refill: 11    Smoker    Chronic obstructive pulmonary disease, unspecified COPD type    Other hyperlipidemia      Counseled on smoking.  States that she could not tolerate the Chantix or Wellbutrin.    Refilled her inhalers.    Start statin agreeable.  Continue with aspirin 81 mg takes it over-the-counter.  Reviewed all tests and above medical conditions with patient in detail and formulated treatment plan.  Risk factor modification discussed.   Cardiac low salt diet discussed.  Maintaining healthy weight and weight loss goals were discussed in clinic.    Follow up in 12 months

## 2024-12-13 ENCOUNTER — PATIENT OUTREACH (OUTPATIENT)
Dept: ADMINISTRATIVE | Facility: HOSPITAL | Age: 74
End: 2024-12-13
Payer: MEDICARE

## 2024-12-13 NOTE — PROGRESS NOTES
VBHM Score: 3     Colon Cancer Screening  Osteoporosis Screening  Mammogram    Influenza Vaccine  Pneumonia Vaccine  Tetanus Vaccine  Shingles/Zoster Vaccine  RSV Vaccine            Pt last seen Feb 2022.  LM offering to schedule annual exam and health screenings.

## 2025-03-24 DIAGNOSIS — Z00.00 ENCOUNTER FOR MEDICARE ANNUAL WELLNESS EXAM: ICD-10-CM

## 2025-05-14 ENCOUNTER — PATIENT OUTREACH (OUTPATIENT)
Dept: ADMINISTRATIVE | Facility: HOSPITAL | Age: 75
End: 2025-05-14
Payer: MEDICARE

## 2025-05-14 NOTE — PROGRESS NOTES
VBHM Score: 3     Colon Cancer Screening  Osteoporosis Screening  Mammogram    Pneumonia Vaccine  Tetanus Vaccine  Shingles/Zoster Vaccine  RSV Vaccine            Pt last seen Feb 2022.  Pt has not responded to attempts to contact her tp schedule appt or verify pcp.  Chart updated, Dr Mcmullen removed as pcp.

## 2025-07-14 DIAGNOSIS — J44.9 COPD, MODERATE: ICD-10-CM

## 2025-07-14 RX ORDER — UMECLIDINIUM BROMIDE AND VILANTEROL TRIFENATATE 62.5; 25 UG/1; UG/1
POWDER RESPIRATORY (INHALATION)
Qty: 60 EACH | Refills: 11 | Status: SHIPPED | OUTPATIENT
Start: 2025-07-14

## 2025-09-04 ENCOUNTER — OFFICE VISIT (OUTPATIENT)
Dept: CARDIOLOGY | Facility: CLINIC | Age: 75
End: 2025-09-04
Payer: MEDICARE

## 2025-09-04 VITALS
DIASTOLIC BLOOD PRESSURE: 60 MMHG | WEIGHT: 169.31 LBS | HEART RATE: 84 BPM | BODY MASS INDEX: 29.99 KG/M2 | OXYGEN SATURATION: 97 % | SYSTOLIC BLOOD PRESSURE: 138 MMHG

## 2025-09-04 DIAGNOSIS — R06.02 SHORTNESS OF BREATH: ICD-10-CM

## 2025-09-04 DIAGNOSIS — E78.49 OTHER HYPERLIPIDEMIA: ICD-10-CM

## 2025-09-04 DIAGNOSIS — J44.9 STAGE 1 MILD COPD BY GOLD CLASSIFICATION: ICD-10-CM

## 2025-09-04 DIAGNOSIS — J44.9 CHRONIC OBSTRUCTIVE PULMONARY DISEASE, UNSPECIFIED COPD TYPE: ICD-10-CM

## 2025-09-04 DIAGNOSIS — J44.9 COPD, MODERATE: Primary | ICD-10-CM

## 2025-09-04 DIAGNOSIS — F17.200 SMOKER: ICD-10-CM

## 2025-09-04 DIAGNOSIS — I70.0 ATHEROSCLEROSIS OF AORTA: ICD-10-CM

## 2025-09-04 PROCEDURE — 1126F AMNT PAIN NOTED NONE PRSNT: CPT | Mod: CPTII,S$GLB,, | Performed by: INTERNAL MEDICINE

## 2025-09-04 PROCEDURE — 3288F FALL RISK ASSESSMENT DOCD: CPT | Mod: CPTII,S$GLB,, | Performed by: INTERNAL MEDICINE

## 2025-09-04 PROCEDURE — 1101F PT FALLS ASSESS-DOCD LE1/YR: CPT | Mod: CPTII,S$GLB,, | Performed by: INTERNAL MEDICINE

## 2025-09-04 PROCEDURE — 3078F DIAST BP <80 MM HG: CPT | Mod: CPTII,S$GLB,, | Performed by: INTERNAL MEDICINE

## 2025-09-04 PROCEDURE — 3008F BODY MASS INDEX DOCD: CPT | Mod: CPTII,S$GLB,, | Performed by: INTERNAL MEDICINE

## 2025-09-04 PROCEDURE — 99214 OFFICE O/P EST MOD 30 MIN: CPT | Mod: S$GLB,,, | Performed by: INTERNAL MEDICINE

## 2025-09-04 PROCEDURE — 99999 PR PBB SHADOW E&M-EST. PATIENT-LVL III: CPT | Mod: PBBFAC,,, | Performed by: INTERNAL MEDICINE

## 2025-09-04 PROCEDURE — 3075F SYST BP GE 130 - 139MM HG: CPT | Mod: CPTII,S$GLB,, | Performed by: INTERNAL MEDICINE

## 2025-09-04 RX ORDER — PRAVASTATIN SODIUM 20 MG/1
20 TABLET ORAL NIGHTLY
Qty: 30 TABLET | Refills: 11 | Status: SHIPPED | OUTPATIENT
Start: 2025-09-04 | End: 2026-09-04

## 2025-09-04 RX ORDER — ALBUTEROL SULFATE 90 UG/1
2 INHALANT RESPIRATORY (INHALATION) EVERY 4 HOURS PRN
Qty: 18 G | Refills: 11 | Status: SHIPPED | OUTPATIENT
Start: 2025-09-04 | End: 2026-09-04

## (undated) DEVICE — CANNULA 25GA SOFT TIP

## (undated) DEVICE — LENS VITRCTMY OPHTH 30DEG 59DE

## (undated) DEVICE — SHEILD & GARTERS FOX METAL EYE

## (undated) DEVICE — FORCEP GRIESHABER MAXGRIP 25G

## (undated) DEVICE — DRESSING EYE OVAL LF

## (undated) DEVICE — PROBE ILLUM FLEX CURVE LASER